# Patient Record
Sex: FEMALE | Race: WHITE | NOT HISPANIC OR LATINO | Employment: FULL TIME | URBAN - METROPOLITAN AREA
[De-identification: names, ages, dates, MRNs, and addresses within clinical notes are randomized per-mention and may not be internally consistent; named-entity substitution may affect disease eponyms.]

---

## 2017-01-06 ENCOUNTER — OFFICE VISIT - HEALTHEAST (OUTPATIENT)
Dept: INTERNAL MEDICINE | Facility: CLINIC | Age: 32
End: 2017-01-06

## 2017-01-06 DIAGNOSIS — H66.002 ACUTE SUPPURATIVE OTITIS MEDIA OF LEFT EAR WITHOUT SPONTANEOUS RUPTURE OF TYMPANIC MEMBRANE, RECURRENCE NOT SPECIFIED: ICD-10-CM

## 2017-01-06 ASSESSMENT — MIFFLIN-ST. JEOR: SCORE: 1401.88

## 2017-02-07 ENCOUNTER — COMMUNICATION - HEALTHEAST (OUTPATIENT)
Dept: INTERNAL MEDICINE | Facility: CLINIC | Age: 32
End: 2017-02-07

## 2017-02-07 ENCOUNTER — AMBULATORY - HEALTHEAST (OUTPATIENT)
Dept: INTERNAL MEDICINE | Facility: CLINIC | Age: 32
End: 2017-02-07

## 2017-02-07 ENCOUNTER — AMBULATORY - HEALTHEAST (OUTPATIENT)
Dept: LAB | Facility: CLINIC | Age: 32
End: 2017-02-07

## 2017-02-07 DIAGNOSIS — N39.0 UTI (URINARY TRACT INFECTION): ICD-10-CM

## 2017-02-09 ENCOUNTER — COMMUNICATION - HEALTHEAST (OUTPATIENT)
Dept: INTERNAL MEDICINE | Facility: CLINIC | Age: 32
End: 2017-02-09

## 2017-02-17 ENCOUNTER — AMBULATORY - HEALTHEAST (OUTPATIENT)
Dept: LAB | Facility: CLINIC | Age: 32
End: 2017-02-17

## 2017-02-17 ENCOUNTER — COMMUNICATION - HEALTHEAST (OUTPATIENT)
Dept: LAB | Facility: CLINIC | Age: 32
End: 2017-02-17

## 2017-02-17 ENCOUNTER — AMBULATORY - HEALTHEAST (OUTPATIENT)
Dept: INTERNAL MEDICINE | Facility: CLINIC | Age: 32
End: 2017-02-17

## 2017-02-17 DIAGNOSIS — N39.0 UTI (URINARY TRACT INFECTION): ICD-10-CM

## 2017-02-23 ENCOUNTER — OFFICE VISIT - HEALTHEAST (OUTPATIENT)
Dept: INTERNAL MEDICINE | Facility: CLINIC | Age: 32
End: 2017-02-23

## 2017-02-23 ENCOUNTER — COMMUNICATION - HEALTHEAST (OUTPATIENT)
Dept: INTERNAL MEDICINE | Facility: CLINIC | Age: 32
End: 2017-02-23

## 2017-02-23 DIAGNOSIS — N39.0 UTI (URINARY TRACT INFECTION): ICD-10-CM

## 2017-02-23 DIAGNOSIS — E10.9 TYPE 1 DIABETES MELLITUS WITHOUT COMPLICATION (H): ICD-10-CM

## 2017-02-23 LAB
CHOLEST SERPL-MCNC: 202 MG/DL
FASTING STATUS PATIENT QL REPORTED: YES
HBA1C MFR BLD: 8.2 % (ref 4.2–6.1)
HDLC SERPL-MCNC: 75 MG/DL
LDLC SERPL CALC-MCNC: 116 MG/DL
TRIGL SERPL-MCNC: 55 MG/DL

## 2017-02-23 ASSESSMENT — MIFFLIN-ST. JEOR: SCORE: 1397.35

## 2017-02-25 ENCOUNTER — COMMUNICATION - HEALTHEAST (OUTPATIENT)
Dept: INTERNAL MEDICINE | Facility: CLINIC | Age: 32
End: 2017-02-25

## 2017-02-27 ENCOUNTER — COMMUNICATION - HEALTHEAST (OUTPATIENT)
Dept: INTERNAL MEDICINE | Facility: CLINIC | Age: 32
End: 2017-02-27

## 2017-04-17 ENCOUNTER — COMMUNICATION - HEALTHEAST (OUTPATIENT)
Dept: INTERNAL MEDICINE | Facility: CLINIC | Age: 32
End: 2017-04-17

## 2017-07-09 ENCOUNTER — COMMUNICATION - HEALTHEAST (OUTPATIENT)
Dept: INTERNAL MEDICINE | Facility: CLINIC | Age: 32
End: 2017-07-09

## 2017-09-27 ENCOUNTER — RECORDS - HEALTHEAST (OUTPATIENT)
Dept: ADMINISTRATIVE | Facility: OTHER | Age: 32
End: 2017-09-27

## 2017-10-01 ENCOUNTER — COMMUNICATION - HEALTHEAST (OUTPATIENT)
Dept: INTERNAL MEDICINE | Facility: CLINIC | Age: 32
End: 2017-10-01

## 2017-10-01 DIAGNOSIS — E10.9 TYPE 1 DIABETES MELLITUS WITHOUT COMPLICATION (H): ICD-10-CM

## 2017-10-19 ENCOUNTER — OFFICE VISIT - HEALTHEAST (OUTPATIENT)
Dept: INTERNAL MEDICINE | Facility: CLINIC | Age: 32
End: 2017-10-19

## 2017-10-19 ENCOUNTER — COMMUNICATION - HEALTHEAST (OUTPATIENT)
Dept: INTERNAL MEDICINE | Facility: CLINIC | Age: 32
End: 2017-10-19

## 2017-10-19 DIAGNOSIS — E10.9 TYPE 1 DIABETES MELLITUS WITHOUT COMPLICATION (H): ICD-10-CM

## 2017-10-19 DIAGNOSIS — G47.9 SLEEP DISTURBANCE: ICD-10-CM

## 2017-10-19 DIAGNOSIS — F41.9 ANXIETY: ICD-10-CM

## 2017-10-19 DIAGNOSIS — R53.83 FATIGUE: ICD-10-CM

## 2017-10-19 LAB
CHOLEST SERPL-MCNC: 186 MG/DL
FASTING STATUS PATIENT QL REPORTED: YES
HBA1C MFR BLD: 7.5 % (ref 3.5–6)
HDLC SERPL-MCNC: 64 MG/DL
LDLC SERPL CALC-MCNC: 112 MG/DL
TRIGL SERPL-MCNC: 48 MG/DL

## 2017-10-19 ASSESSMENT — MIFFLIN-ST. JEOR: SCORE: 1420.03

## 2017-10-23 ENCOUNTER — AMBULATORY - HEALTHEAST (OUTPATIENT)
Dept: INTERNAL MEDICINE | Facility: CLINIC | Age: 32
End: 2017-10-23

## 2017-10-23 DIAGNOSIS — G47.9 SLEEP DISORDER: ICD-10-CM

## 2017-11-02 ENCOUNTER — COMMUNICATION - HEALTHEAST (OUTPATIENT)
Dept: INTERNAL MEDICINE | Facility: CLINIC | Age: 32
End: 2017-11-02

## 2017-11-06 ENCOUNTER — COMMUNICATION - HEALTHEAST (OUTPATIENT)
Dept: INTERNAL MEDICINE | Facility: CLINIC | Age: 32
End: 2017-11-06

## 2017-12-06 ENCOUNTER — RECORDS - HEALTHEAST (OUTPATIENT)
Dept: ADMINISTRATIVE | Facility: OTHER | Age: 32
End: 2017-12-06

## 2017-12-06 LAB
LAB AP CHARGES (HE HISTORICAL CONVERSION): NORMAL
LAB MED GENERAL PATH INTERP (HE HISTORICAL CONVERSION): NORMAL
PATH REPORT.COMMENTS IMP SPEC: NORMAL
PATH REPORT.FINAL DX SPEC: NORMAL
PATH REPORT.MICROSCOPIC SPEC OTHER STN: NORMAL
SPECIMEN DESCRIPTION: NORMAL

## 2018-01-22 ENCOUNTER — COMMUNICATION - HEALTHEAST (OUTPATIENT)
Dept: INTERNAL MEDICINE | Facility: CLINIC | Age: 33
End: 2018-01-22

## 2018-01-24 ENCOUNTER — RECORDS - HEALTHEAST (OUTPATIENT)
Dept: ADMINISTRATIVE | Facility: OTHER | Age: 33
End: 2018-01-24

## 2018-01-24 ENCOUNTER — AMBULATORY - HEALTHEAST (OUTPATIENT)
Dept: INTERNAL MEDICINE | Facility: CLINIC | Age: 33
End: 2018-01-24

## 2018-01-24 DIAGNOSIS — M25.559 HIP PAIN: ICD-10-CM

## 2018-01-24 DIAGNOSIS — R10.9 ABDOMINAL PAIN: ICD-10-CM

## 2018-01-25 ENCOUNTER — COMMUNICATION - HEALTHEAST (OUTPATIENT)
Dept: INTERNAL MEDICINE | Facility: CLINIC | Age: 33
End: 2018-01-25

## 2018-01-30 ENCOUNTER — RECORDS - HEALTHEAST (OUTPATIENT)
Dept: ADMINISTRATIVE | Facility: OTHER | Age: 33
End: 2018-01-30

## 2018-02-01 ENCOUNTER — OFFICE VISIT - HEALTHEAST (OUTPATIENT)
Dept: SLEEP MEDICINE | Facility: CLINIC | Age: 33
End: 2018-02-01

## 2018-02-01 DIAGNOSIS — G47.10 HYPERSOMNIA: ICD-10-CM

## 2018-02-01 DIAGNOSIS — G47.8 SLEEP DYSFUNCTION WITH SLEEP STAGE DISTURBANCE: ICD-10-CM

## 2018-02-01 DIAGNOSIS — R06.83 SNORING: ICD-10-CM

## 2018-02-01 ASSESSMENT — MIFFLIN-ST. JEOR: SCORE: 1400.07

## 2018-02-05 ENCOUNTER — COMMUNICATION - HEALTHEAST (OUTPATIENT)
Dept: INTERNAL MEDICINE | Facility: CLINIC | Age: 33
End: 2018-02-05

## 2018-02-16 ENCOUNTER — RECORDS - HEALTHEAST (OUTPATIENT)
Dept: ADMINISTRATIVE | Facility: OTHER | Age: 33
End: 2018-02-16

## 2018-02-21 ENCOUNTER — COMMUNICATION - HEALTHEAST (OUTPATIENT)
Dept: EDUCATION SERVICES | Facility: CLINIC | Age: 33
End: 2018-02-21

## 2018-02-22 ENCOUNTER — RECORDS - HEALTHEAST (OUTPATIENT)
Dept: SLEEP MEDICINE | Facility: CLINIC | Age: 33
End: 2018-02-22

## 2018-02-22 ENCOUNTER — RECORDS - HEALTHEAST (OUTPATIENT)
Dept: ADMINISTRATIVE | Facility: OTHER | Age: 33
End: 2018-02-22

## 2018-02-22 DIAGNOSIS — G47.10 HYPERSOMNIA, UNSPECIFIED: ICD-10-CM

## 2018-02-22 DIAGNOSIS — R06.83 SNORING: ICD-10-CM

## 2018-02-22 DIAGNOSIS — G47.8 OTHER SLEEP DISORDERS: ICD-10-CM

## 2018-03-03 ENCOUNTER — COMMUNICATION - HEALTHEAST (OUTPATIENT)
Dept: SLEEP MEDICINE | Facility: CLINIC | Age: 33
End: 2018-03-03

## 2018-03-05 ENCOUNTER — COMMUNICATION - HEALTHEAST (OUTPATIENT)
Dept: INTERNAL MEDICINE | Facility: CLINIC | Age: 33
End: 2018-03-05

## 2018-03-06 ENCOUNTER — AMBULATORY - HEALTHEAST (OUTPATIENT)
Dept: INTERNAL MEDICINE | Facility: CLINIC | Age: 33
End: 2018-03-06

## 2018-03-12 ENCOUNTER — RECORDS - HEALTHEAST (OUTPATIENT)
Dept: ADMINISTRATIVE | Facility: OTHER | Age: 33
End: 2018-03-12

## 2018-03-16 ENCOUNTER — AMBULATORY - HEALTHEAST (OUTPATIENT)
Dept: LAB | Facility: CLINIC | Age: 33
End: 2018-03-16

## 2018-03-16 ENCOUNTER — COMMUNICATION - HEALTHEAST (OUTPATIENT)
Dept: SCHEDULING | Facility: CLINIC | Age: 33
End: 2018-03-16

## 2018-03-16 ENCOUNTER — COMMUNICATION - HEALTHEAST (OUTPATIENT)
Dept: INTERNAL MEDICINE | Facility: CLINIC | Age: 33
End: 2018-03-16

## 2018-03-16 ENCOUNTER — AMBULATORY - HEALTHEAST (OUTPATIENT)
Dept: INTERNAL MEDICINE | Facility: CLINIC | Age: 33
End: 2018-03-16

## 2018-03-16 DIAGNOSIS — R30.0 DYSURIA: ICD-10-CM

## 2018-03-16 DIAGNOSIS — N39.0 UTI (URINARY TRACT INFECTION): ICD-10-CM

## 2018-03-16 LAB
ALBUMIN UR-MCNC: NEGATIVE MG/DL
APPEARANCE UR: CLEAR
BACTERIA #/AREA URNS HPF: ABNORMAL HPF
BILIRUB UR QL STRIP: NEGATIVE
COLOR UR AUTO: YELLOW
GLUCOSE UR STRIP-MCNC: ABNORMAL MG/DL
HGB UR QL STRIP: NEGATIVE
KETONES UR STRIP-MCNC: NEGATIVE MG/DL
LEUKOCYTE ESTERASE UR QL STRIP: NEGATIVE
NITRATE UR QL: POSITIVE
PH UR STRIP: 5 [PH] (ref 5–8)
RBC #/AREA URNS AUTO: ABNORMAL HPF
SP GR UR STRIP: 1.02 (ref 1–1.03)
SQUAMOUS #/AREA URNS AUTO: ABNORMAL LPF
UROBILINOGEN UR STRIP-ACNC: ABNORMAL
WBC #/AREA URNS AUTO: ABNORMAL HPF

## 2018-03-18 LAB — BACTERIA SPEC CULT: ABNORMAL

## 2018-04-03 ENCOUNTER — RECORDS - HEALTHEAST (OUTPATIENT)
Dept: ADMINISTRATIVE | Facility: OTHER | Age: 33
End: 2018-04-03

## 2018-04-09 ENCOUNTER — RECORDS - HEALTHEAST (OUTPATIENT)
Dept: ADMINISTRATIVE | Facility: OTHER | Age: 33
End: 2018-04-09

## 2018-04-17 ENCOUNTER — RECORDS - HEALTHEAST (OUTPATIENT)
Dept: ADMINISTRATIVE | Facility: OTHER | Age: 33
End: 2018-04-17

## 2018-04-20 ENCOUNTER — RECORDS - HEALTHEAST (OUTPATIENT)
Dept: ADMINISTRATIVE | Facility: OTHER | Age: 33
End: 2018-04-20

## 2018-04-24 ENCOUNTER — RECORDS - HEALTHEAST (OUTPATIENT)
Dept: ADMINISTRATIVE | Facility: OTHER | Age: 33
End: 2018-04-24

## 2018-05-11 ENCOUNTER — HOSPITAL ENCOUNTER (OUTPATIENT)
Dept: NUCLEAR MEDICINE | Facility: CLINIC | Age: 33
Discharge: HOME | End: 2018-05-11

## 2018-05-11 DIAGNOSIS — R14.0 BLOATING: ICD-10-CM

## 2018-05-14 ENCOUNTER — RECORDS - HEALTHEAST (OUTPATIENT)
Dept: ADMINISTRATIVE | Facility: OTHER | Age: 33
End: 2018-05-14

## 2018-05-24 ENCOUNTER — RECORDS - HEALTHEAST (OUTPATIENT)
Dept: ADMINISTRATIVE | Facility: OTHER | Age: 33
End: 2018-05-24

## 2018-06-01 ENCOUNTER — RECORDS - HEALTHEAST (OUTPATIENT)
Dept: ADMINISTRATIVE | Facility: OTHER | Age: 33
End: 2018-06-01

## 2018-06-08 ENCOUNTER — RECORDS - HEALTHEAST (OUTPATIENT)
Dept: ADMINISTRATIVE | Facility: OTHER | Age: 33
End: 2018-06-08

## 2018-06-14 ENCOUNTER — RECORDS - HEALTHEAST (OUTPATIENT)
Dept: ADMINISTRATIVE | Facility: OTHER | Age: 33
End: 2018-06-14

## 2018-06-20 ENCOUNTER — COMMUNICATION - HEALTHEAST (OUTPATIENT)
Dept: INTERNAL MEDICINE | Facility: CLINIC | Age: 33
End: 2018-06-20

## 2018-08-03 ENCOUNTER — RECORDS - HEALTHEAST (OUTPATIENT)
Dept: ADMINISTRATIVE | Facility: OTHER | Age: 33
End: 2018-08-03

## 2018-09-08 ENCOUNTER — RECORDS - HEALTHEAST (OUTPATIENT)
Dept: ADMINISTRATIVE | Facility: OTHER | Age: 33
End: 2018-09-08

## 2018-11-02 ENCOUNTER — RECORDS - HEALTHEAST (OUTPATIENT)
Dept: ADMINISTRATIVE | Facility: OTHER | Age: 33
End: 2018-11-02

## 2018-11-05 ENCOUNTER — RECORDS - HEALTHEAST (OUTPATIENT)
Dept: ADMINISTRATIVE | Facility: OTHER | Age: 33
End: 2018-11-05

## 2018-11-16 ENCOUNTER — RECORDS - HEALTHEAST (OUTPATIENT)
Dept: ADMINISTRATIVE | Facility: OTHER | Age: 33
End: 2018-11-16

## 2018-11-30 ENCOUNTER — OFFICE VISIT - HEALTHEAST (OUTPATIENT)
Dept: FAMILY MEDICINE | Facility: CLINIC | Age: 33
End: 2018-11-30

## 2018-11-30 DIAGNOSIS — J06.9 VIRAL UPPER RESPIRATORY TRACT INFECTION: ICD-10-CM

## 2018-11-30 DIAGNOSIS — H69.93 EUSTACHIAN TUBE DYSFUNCTION, BILATERAL: ICD-10-CM

## 2018-11-30 LAB — DEPRECATED S PYO AG THROAT QL EIA: NORMAL

## 2018-12-01 LAB — GROUP A STREP BY PCR: NORMAL

## 2018-12-03 ENCOUNTER — COMMUNICATION - HEALTHEAST (OUTPATIENT)
Dept: INTERNAL MEDICINE | Facility: CLINIC | Age: 33
End: 2018-12-03

## 2019-01-24 ENCOUNTER — COMMUNICATION - HEALTHEAST (OUTPATIENT)
Dept: INTERNAL MEDICINE | Facility: CLINIC | Age: 34
End: 2019-01-24

## 2019-01-25 ENCOUNTER — OFFICE VISIT - HEALTHEAST (OUTPATIENT)
Dept: FAMILY MEDICINE | Facility: CLINIC | Age: 34
End: 2019-01-25

## 2019-01-25 DIAGNOSIS — S60.10XA HEMATOMA, SUBUNGUAL, FINGER, INITIAL ENCOUNTER: ICD-10-CM

## 2019-01-25 DIAGNOSIS — S67.10XA CRUSHING INJURY OF FINGER, INITIAL ENCOUNTER: ICD-10-CM

## 2019-01-31 ENCOUNTER — RECORDS - HEALTHEAST (OUTPATIENT)
Dept: ADMINISTRATIVE | Facility: OTHER | Age: 34
End: 2019-01-31

## 2019-02-20 ENCOUNTER — RECORDS - HEALTHEAST (OUTPATIENT)
Dept: ADMINISTRATIVE | Facility: OTHER | Age: 34
End: 2019-02-20

## 2019-02-25 ENCOUNTER — RECORDS - HEALTHEAST (OUTPATIENT)
Dept: ADMINISTRATIVE | Facility: OTHER | Age: 34
End: 2019-02-25

## 2019-02-28 ENCOUNTER — RECORDS - HEALTHEAST (OUTPATIENT)
Dept: ADMINISTRATIVE | Facility: OTHER | Age: 34
End: 2019-02-28

## 2019-03-10 ENCOUNTER — RECORDS - HEALTHEAST (OUTPATIENT)
Dept: ADMINISTRATIVE | Facility: OTHER | Age: 34
End: 2019-03-10

## 2019-04-10 ENCOUNTER — RECORDS - HEALTHEAST (OUTPATIENT)
Dept: ADMINISTRATIVE | Facility: OTHER | Age: 34
End: 2019-04-10

## 2019-04-22 ENCOUNTER — RECORDS - HEALTHEAST (OUTPATIENT)
Dept: ADMINISTRATIVE | Facility: OTHER | Age: 34
End: 2019-04-22

## 2019-04-25 ENCOUNTER — COMMUNICATION - HEALTHEAST (OUTPATIENT)
Dept: INTERNAL MEDICINE | Facility: CLINIC | Age: 34
End: 2019-04-25

## 2019-04-29 ENCOUNTER — RECORDS - HEALTHEAST (OUTPATIENT)
Dept: ADMINISTRATIVE | Facility: OTHER | Age: 34
End: 2019-04-29

## 2019-05-21 ENCOUNTER — RECORDS - HEALTHEAST (OUTPATIENT)
Dept: ADMINISTRATIVE | Facility: OTHER | Age: 34
End: 2019-05-21

## 2019-08-20 ENCOUNTER — RECORDS - HEALTHEAST (OUTPATIENT)
Dept: ADMINISTRATIVE | Facility: OTHER | Age: 34
End: 2019-08-20

## 2019-09-05 ENCOUNTER — RECORDS - HEALTHEAST (OUTPATIENT)
Dept: ADMINISTRATIVE | Facility: OTHER | Age: 34
End: 2019-09-05

## 2019-11-27 ENCOUNTER — RECORDS - HEALTHEAST (OUTPATIENT)
Dept: ADMINISTRATIVE | Facility: OTHER | Age: 34
End: 2019-11-27

## 2019-12-10 ENCOUNTER — RECORDS - HEALTHEAST (OUTPATIENT)
Dept: ADMINISTRATIVE | Facility: OTHER | Age: 34
End: 2019-12-10

## 2020-01-07 ENCOUNTER — RECORDS - HEALTHEAST (OUTPATIENT)
Dept: ADMINISTRATIVE | Facility: OTHER | Age: 35
End: 2020-01-07

## 2020-04-30 ENCOUNTER — RECORDS - HEALTHEAST (OUTPATIENT)
Dept: ADMINISTRATIVE | Facility: OTHER | Age: 35
End: 2020-04-30

## 2021-04-08 ENCOUNTER — AMBULATORY - HEALTHEAST (OUTPATIENT)
Dept: LAB | Facility: CLINIC | Age: 36
End: 2021-04-08

## 2021-04-08 ENCOUNTER — OFFICE VISIT - HEALTHEAST (OUTPATIENT)
Dept: FAMILY MEDICINE | Facility: CLINIC | Age: 36
End: 2021-04-08

## 2021-04-08 DIAGNOSIS — Z20.822 CLOSE EXPOSURE TO 2019 NOVEL CORONAVIRUS: ICD-10-CM

## 2021-04-10 ENCOUNTER — COMMUNICATION - HEALTHEAST (OUTPATIENT)
Dept: SCHEDULING | Facility: CLINIC | Age: 36
End: 2021-04-10

## 2021-05-27 ENCOUNTER — RECORDS - HEALTHEAST (OUTPATIENT)
Dept: ADMINISTRATIVE | Facility: OTHER | Age: 36
End: 2021-05-27

## 2021-05-30 VITALS — WEIGHT: 147 LBS | HEIGHT: 68 IN | BODY MASS INDEX: 22.28 KG/M2

## 2021-05-30 VITALS — WEIGHT: 148 LBS | BODY MASS INDEX: 22.43 KG/M2 | HEIGHT: 68 IN

## 2021-05-31 VITALS — BODY MASS INDEX: 22.68 KG/M2 | WEIGHT: 147 LBS | HEIGHT: 68 IN | BODY MASS INDEX: 22.68 KG/M2

## 2021-05-31 VITALS — HEIGHT: 68 IN | WEIGHT: 147.6 LBS | BODY MASS INDEX: 22.37 KG/M2

## 2021-05-31 VITALS — BODY MASS INDEX: 23.04 KG/M2 | HEIGHT: 68 IN | WEIGHT: 152 LBS

## 2021-06-02 VITALS — BODY MASS INDEX: 22.38 KG/M2 | WEIGHT: 145 LBS

## 2021-06-02 VITALS — WEIGHT: 142 LBS | BODY MASS INDEX: 21.91 KG/M2

## 2021-06-08 NOTE — PROGRESS NOTES
"  Office Visit - Follow Up   Kylah Taylor   31 y.o. female    Date of Visit: 1/6/2017    Chief Complaint   Patient presents with     Sinus Problem        Assessment and Plan   1. Acute suppurative otitis media of left ear without spontaneous rupture of tympanic membrane, recurrence not specified  We'll treat with Augmentin, recommended Sudafed, plenty of rest ibuprofen and acetaminophen    Return if symptoms worsen or fail to improve, for follow up with PCP.     History of Present Illness   This 31 y.o. old woman comes in for evaluation of over 1 week of sore throat and ear pain.  She is in [place] and arrived home on Wednesday.  She had excruciating pain in her left ear with takeoff and landing and this has persisted.  She has significant frontal facial pain and tooth pain.  He can't hear out of her left ear very well.  No fever or chills.  Some cough.  No shortness of breath.  No normal exposures while in [place]     Review of Systems: A comprehensive review of systems was negative except as noted.     Medications, Allergies and Problem List   Reviewed and updated     Physical Exam   General Appearance:   No acute distress    Visit Vitals     /62 (Patient Site: Right Arm, Patient Position: Sitting, Cuff Size: Adult Regular)     Pulse 89     Ht 5' 7.5\" (1.715 m)     Wt 148 lb (67.1 kg)     SpO2 99%     BMI 22.84 kg/m2       She has erythema bulging and purulence of the left tympanic membrane pain with palpation of the maxillary sinuses and lymphoid reticulation posterior oropharynx.  She has shotty cervical lymphadenopathy more on the left and right lungs clear auscultation bilaterally cardiovascular regular rate and rhythm no murmur gallop or rub      Additional Information   Current Outpatient Prescriptions   Medication Sig Dispense Refill     amoxicillin-clavulanate (AUGMENTIN) 875-125 mg per tablet Take 1 tablet by mouth 2 (two) times a day for 10 days. 20 tablet 0     CONTOUR NEXT STRIPS " strips USE THREE TIMES A  each 0     NOVOLOG 100 unit/mL injection INJECT UP TO 60 UNITS DAILY WITH INSULIN PUMP AS DIRECTED 20 mL 5     NOVOLOG 100 unit/mL injection INJECT UP TO 60 UNITS DAILY WITH INSULIN PUMP AS DIRECTED 20 mL 4     No current facility-administered medications for this visit.      No Known Allergies  Social History   Substance Use Topics     Smoking status: Never Smoker     Smokeless tobacco: Never Used     Alcohol use None       Review and/or order of clinical lab tests:  Review and/or order of radiology tests:  Review and/or order of medicine tests:  Discussion of test results with performing physician:  Decision to obtain old records and/or obtain history from someone other than the patient:  Review and summarization of old records and/or obtaining history from someone other than the patient and.or discussion of case with another health care provider:  Independent visualization of image, tracing or specimen itself:    Time:      Jeremy Anderson MD

## 2021-06-09 ENCOUNTER — RECORDS - HEALTHEAST (OUTPATIENT)
Dept: LAB | Facility: CLINIC | Age: 36
End: 2021-06-09

## 2021-06-09 LAB
APPEARANCE FLD: CLEAR
COLOR FLD: ABNORMAL
CRYSTALS SNV MICRO: NORMAL
EOSINOPHIL NFR FLD MANUAL: ABNORMAL %
LYMPHOCYTES NFR FLD MANUAL: 68 %
MACROPHAGE % - HISTORICAL: ABNORMAL
MESOTHELIALS, FLUID: ABNORMAL
MONOCYTE % - HISTORICAL: 27 %
NEUTS BAND NFR FLD MANUAL: 5 %
OTHER CELLS FLD MANUAL: ABNORMAL
RBC FLUID - HISTORICAL: ABNORMAL /UL
WBC # FLD AUTO: 348 /UL (ref 0–99)

## 2021-06-09 NOTE — PROGRESS NOTES
[facility] Clinic Follow Up Note    Kylah Taylor   31 y.o. female    Date of Visit: 2/23/2017    Chief Complaint   Patient presents with     Diabetes     pt fasting     Subjective  This is a very pleasant 31-year-old lady with a long-standing history of type 1 diabetes.  When I saw her last summer she was having some vague abdominal discomfort.  I would refer to my notes for details.  The symptoms have not entirely cleared but they are not preventing her from going about her usual activities and they have not gotten any worse.  She blames it on some poor eating habits that she says she has developed.  She was also under some stress as her mother and mother-in-law are both living with her.  Since then her mother-in-law has a left the home and so she is only dealing with her own mother at this time.  She recently had to infectious incidence.  The first was an ear infection following a trip to [place].  This cleared with antibiotics.  Then, more recently she developed a urinary tract infection.  This also responded nicely to an antibiotic and she is now symptom-free.  Otherwise she has been feeling reasonably good although she does feel she has some ongoing fatigue.  She did not bring a log book but her sugars she said been under fairly good control except during the infection.  She does use an insulin pump.  She comes in today because she is due for routine lab work relative to the diabetes.    ROS A comprehensive review of systems was performed and was otherwise negative    Medications, allergies, and problem list were reviewed and updated    Exam  General Appearance:   On examination her blood pressure is 122/60.  Weight is 147 pounds and height is 67.5 inches.  BMI is 22.68.    Lungs are clear.    Heart is in a sinus rhythm with a rate of 82 and no ectopy.    No peripheral edema.  Gait is normal.    The patient is alert and oriented ×3.      Assessment/Plan  1. Type 1 diabetes  mellitus without complication  Comprehensive Metabolic Panel    Lipid Cascade    Glycosylated Hemoglobin A1c    Microalbumin, Random Urine     Type 1 diabetes that has been reasonably well controlled on the insulin pump.  Her recent infection seemed to have cleared.  We will go ahead with lab work to include CMP, lipids, A1c and microalbumin.  I will contact her with those results and make any appropriate recommendations.  She said that she will try harder with the diet.  I will follow-up with her in 6 months or so.    Total time of this office visit was 25 minutes with greater than 50% of the time spent in care coordination and patient counseling.      Jeremy Leon MD      Current Outpatient Prescriptions on File Prior to Visit   Medication Sig     CONTOUR NEXT STRIPS strips USE THREE TIMES A DAY     NOVOLOG 100 unit/mL injection INJECT UP TO 60 UNITS DAILY WITH INSULIN PUMP AS DIRECTED     NOVOLOG 100 unit/mL injection INJECT UP TO 60 UNITS DAILY WITH INSULIN PUMP AS DIRECTED     No current facility-administered medications on file prior to visit.      No Known Allergies  Social History   Substance Use Topics     Smoking status: Never Smoker     Smokeless tobacco: Never Used     Alcohol use None

## 2021-06-12 LAB
BACTERIA SPEC CULT: NO GROWTH
BACTERIA SPEC CULT: NORMAL
GRAM STAIN RESULT: NORMAL
GRAM STAIN RESULT: NORMAL

## 2021-06-13 NOTE — PROGRESS NOTES
[facility] Clinic Follow Up Note    Kylah Taylor   32 y.o. female    Date of Visit: 10/19/2017    Chief Complaint   Patient presents with     Diabetes     pt fasting     Subjective  This is a 32-year-old longtime type I diabetic.  She comes in because over the past 5 months she has had a marked increase in fatigue, weight gain, occasional nighttime cardiac palpitation and some increased irritability and anxiety.  She is somewhat of a restless sleeper.  She is not sure if she snores.  She tells me that even if she gets 8-9 hours of sleep she is still fatigued in the morning she reports that her sugars have been somewhat variable.  She has not seen an endocrinologist in quite some time and would probably benefit from seeing 1.  She is on an insulin pump.  She offers no further specific symptoms other than what we just listed.  She feels that over the past 5 months all of these symptoms have persisted and possibly gotten slightly worse.  No recent changes in medication.  She does not feel that she is under any undue stress or tension either at home or at work.    ROS A comprehensive review of systems was performed and was otherwise negative    Medications, allergies, and problem list were reviewed and updated    Exam  General Appearance:   On examination her blood pressure is 122/60.  Weight is 152 pounds and height is 67.5 inches.  BMI is 21.29.    Neck: Supple with no masses and no neck vein distention.  No thyroid enlargement.    Lungs: Clear.    Cardiovascular: Heart is in a sinus rhythm with a rate of 80 and no ectopy.  I hear no gallops or murmurs.  Carotid pulses are full.  No peripheral edema.    GI: Abdomen is soft and nontender with no distention.  No obvious masses organomegaly.    The patient is alert and oriented ×3.  Mood and affect seem appropriate today.      Assessment/Plan  1. Type 1 diabetes mellitus without complication  Lipid Cascade    Glycosylated Hemoglobin A1c     Ambulatory referral to Endocrinology   2. Fatigue  Comprehensive Metabolic Panel    Thyroid Stimulating Hormone (TSH)    HM1(CBC and Differential)    HM1 (CBC with Diff)   3. Anxiety     4. Sleep disturbance       Type 1 diabetes.  I did discuss this with her and a am not sure if her symptoms are related to the type 1 diabetes.  I do think it would be advantageous for her to reconnect with an endocrinologist especially as she is on the insulin pump.  She is agreeable to this and so we will set up a consultation.    Worsening fatigue.  Coupling this with her weight gain and palpitations and irritability and anxiety am not sure what the etiology is.  I would like to check a CMP, CBC and TSH.  We will also check her diabetic labs today including A1c and lipids.  My other concern is the possibility of sleep apnea.  Given her set of symptoms and problems with sleep this certainly is a possibility.  I discussed this with her and suggested that if we do not see something very specific on the labs we would certainly consider referral for sleep evaluation.  She is agreeable to this.    Total time of this office visit was 25 minutes with greater than 50% of the time spent in care coordination of patient counseling.      Jeremy Leon MD      Current Outpatient Prescriptions on File Prior to Visit   Medication Sig     CONTOUR NEXT STRIPS strips USE THREE TIMES A DAY     NOVOLOG 100 unit/mL injection INJECT UP TO 60 UNITS DAILY WITH INSULIN PUMP AS DIRECTED     No current facility-administered medications on file prior to visit.      No Known Allergies  Social History   Substance Use Topics     Smoking status: Never Smoker     Smokeless tobacco: Never Used     Alcohol use None

## 2021-06-15 NOTE — PROGRESS NOTES
Dear Dr. Jeremy Leon Md  [address] [place] [address]    Thank you for the opportunity to participate in the care of MsRenu Taylor.    She is a 32 y.o. female who comes to the clinic with a chief complaint of nonrestorative sleep has been going on for more than 5 years.  While the patient denies any episodes of witnessed apnea she has been told that she does have snoring during sleep.  The patient can sleep up to 12 hours per night but still feel tired upon awakening.  The patient's review of system is otherwise unremarkable.     Ideal Sleep-Wake Cycle(devoid of societal pressure):    Patient would try to initiate sleep at around 10-12 at night with a sleep latency of up to 30 minutes. The patient would have 5+ awakening. Final wake up time is around 8-9:30 AM.    Past Medical History  No past medical history on file.     Past Surgical History  No past surgical history on file.     Meds  Current Outpatient Prescriptions   Medication Sig Dispense Refill     CONTOUR NEXT STRIPS strips USE THREE TIMES A  each 3     NOVOLOG 100 unit/mL injection INJECT UP TO 60 UNITS DAILY WITH INSULIN PUMP AS DIRECTED 20 mL 2     No current facility-administered medications for this visit.         Allergies  Review of patient's allergies indicates no known allergies.     Social History  Social History     Social History     Marital status:      Spouse name: N/A     Number of children: N/A     Years of education: N/A     Occupational History     Not on file.     Social History Main Topics     Smoking status: Never Smoker     Smokeless tobacco: Never Used     Alcohol use Not on file     Drug use: Not on file     Sexual activity: Not on file     Other Topics Concern     Not on file     Social History Narrative        Family History  No family history on file.     Review of Systems:  Constitutional: Negative except as noted in HPI.   Eyes: Negative except as noted in HPI.   ENT: Negative  "except as noted in HPI.   Cardiovascular: Negative except as noted in HPI.   Respiratory: Negative except as noted in HPI.   Gastrointestinal: Negative except as noted in HPI.   Genitourinary: Negative except as noted in HPI.   Musculoskeletal: Negative except as noted in HPI.   Integumentary: Negative except as noted in HPI.   Neurological: Negative except as noted in HPI.   Psychiatric: Negative except as noted in HPI.   Endocrine: Negative except as noted in HPI.   Hematologic/Lymphatic: Negative except as noted in HPI.      STOP BANG 2/1/2018   Do you snore loudly (louder than talking or loud enough to be heard through closed doors)? 0   Do you often feel tired, fatigued, or sleepy during daytime? 1   Has anyone observed you stop breathing in your sleep? 0   Do you have or are you being treated for high blood pressure? 0   BMI more than 35 kg/m2 0   Age over 50 years old? 0   Neck circumference greater than 16 inches? 0   Gender male? 0   Total Score 1   Epworths Sleepiness Scale 2/1/2018   Sitting and reading 0   Watching TV 1   Sitting, inactive in a public place (e.g. a theatre or a meeting) 0   As a passenger in a car for an hour without a break 1   Lying down to rest in the afternoon when circumstances permit 3   Sitting and talking to someone 0   Sitting quietly after a lunch without alcohol 0   In a car, while stopped for a few minutes in traffic 0   Total score 5   Rooming 2/1/2018   Usual bedtime 10-12   Sleep Latency depends   Awakenings 5+   Wake Up Time 815-9   Energy Drinks 0   Coffee 1-4 small weekly   Cola 0   Difficulty falling asleep Yes   Difficulty staying asleep Yes   Excessive daytime tiredness Yes   Excessive daytime sleepiness Yes   Dozing off while driving No   Shift Worker No   Sleep Walking? No   Sleep Talking? No   Kicking or punching? No   Restless legs symptoms No       Physical Exam:  /62  Pulse 81  Ht 5' 7.5\" (1.715 m)  Wt 147 lb 9.6 oz (67 kg)  SpO2 100%  BMI 22.78 " "kg/m2  BMI:Body mass index is 22.78 kg/(m^2).   GEN: NAD, appropriate for age  Head: Normocephalic.  EYES: PERRLA, EOMI  ENT: Oropharynx is clear, mallampatti class 3+ airway. Uvula is intact  Nasal mucosa is moist without erythema  Neck : Thyroid is within normal limits. Neck circ 12\"  CV: Regular rate and rhythm, S1 & S2 positive.  LUNGS: Bilateral breathsounds heard.   ABDOMEN: Positive bowel sounds in all quadrants, soft, no rebound or guarding  MUSCULOSKELETAL: Bilateral trace leg swelling  SKIN: warm, dry, no rashes  Neurological: Alert, oriented to time, place, and person.  Psych: normal mood, normal affect     Labs/Studies:     Lab Results   Component Value Date    WBC 5.4 10/19/2017    HGB 12.8 10/19/2017    HCT 38.5 10/19/2017    MCV 90 10/19/2017     10/19/2017         Chemistry        Component Value Date/Time     10/19/2017 1135    K 3.8 10/19/2017 1135     10/19/2017 1135    CO2 27 10/19/2017 1135    BUN 11 10/19/2017 1135    CREATININE 0.78 10/19/2017 1135     (H) 10/19/2017 1135        Component Value Date/Time    CALCIUM 9.1 10/19/2017 1135    ALKPHOS 48 10/19/2017 1135    AST 10 10/19/2017 1135    ALT 10 10/19/2017 1135    BILITOT 0.9 10/19/2017 1135            Lab Results   Component Value Date    FERRITIN 61 12/02/2015     Lab Results   Component Value Date    TSH 1.18 10/19/2017         Assessment and Plan:  In summary Kylah Taylor is a 32 y.o. year old female here for sleep disturbance.  1.  Hypersomnia   Mrs. Kylah Taylor has high risk for obstructive sleep apnea based on the history of hypersomnia, snoring and a crowded airway. I educated the patient on the underlying pathophysiology of obstructive sleep apnea. We reviewed the risks associated with sleep apnea, including increased cardiovascular risk and overall death. We talked about treatments briefly. I recommend getting an split-night nocturnal polysomnography. The patient should return to the " clinic to discuss results and treatment option in a patient-centered approach.  2.  Snoring  3.  Other sleep disturbance    Patient verbalized understanding of these issues, agrees with the plan and all questions were answered today. Patient was given an opportuntity to voice any other symptoms or concerns not listed above. Patient did not have any other symptoms or concerns.      Patient told to return in one week after the sleep study is interpreted.      Lopez Varma DO  Board Certified in Internal Medicine and Sleep Medicine  [facility].    (Note created with Dragon voice recognition and unintended spelling errors and word substitutions may occur)

## 2021-06-16 PROBLEM — E11.43 GASTROPARESIS DIABETICORUM (H): Status: ACTIVE | Noted: 2018-05-24

## 2021-06-16 PROBLEM — K31.84 GASTROPARESIS DIABETICORUM (H): Status: ACTIVE | Noted: 2018-05-24

## 2021-06-16 PROBLEM — K63.8219 SMALL INTESTINAL BACTERIAL OVERGROWTH: Status: ACTIVE | Noted: 2018-05-24

## 2021-06-16 PROBLEM — K59.00 CONSTIPATION, UNSPECIFIED: Status: ACTIVE | Noted: 2018-03-12

## 2021-06-16 PROBLEM — R53.83 FATIGUE: Status: ACTIVE | Noted: 2019-12-08

## 2021-06-16 NOTE — PATIENT INSTRUCTIONS - HE
"  Dear Kylah Taylor,    Based on your exposure to COVID-19 (coronavirus), we would like to test you for this virus. I have placed an order for this test.The best time for testing is 5-7 days after the exposure.    How to schedule:  Go to your [employer] home page and scroll down to the section that says  You have an appointment that needs to be scheduled  and click the large green button that says  Schedule Now  and follow the steps to find the next available opening.     If you are unable to complete these [employer] scheduling steps, please call [phone] to schedule your testing.     Return to work/school/ guidance:   For people with high risk exposures outside the home    Please let your workplace manager and staffing office know when your quarantine ends.     We can not give you an exact date as it depends on the information below. You can calculate this on your own or work with your manager/staffing office to calculate this. (For example if you were exposed on 10/4, you would have to quarantine for 14 full days. That would be through 10/18. You could return on 10/19.)    Quarantine Guidelines:  Patients (\"contacts\") who have been in close prolonged contact of an infected person(s) (within six feet for at least 15 minutes within a 24 hour period), and remain asymptomatic should enter quarantine based on the following options:    14-day quarantine period (this remains the CDC recommendation for the greatest protection against spread of COVID-19) OR    Minimum 7-day quarantine with negative RT-PCR test collected on day 5 or later OR    10-day quarantine with no test  Quarantine Guideline exceptions are as follows:    People who have been fully vaccinated do not need to quarantine if the exposure was at least 2 weeks after the last vaccination. This includes vaccinated health care workers.    Not fully vaccinated and unvaccinated Individuals who work in health care, congregate care, or congregate " living should be off work for 14 days from their last date of exposure. Community activities for this group can be resumed based on options above. Fully vaccinated individuals in this group do not need to quarantine from work after exposure.    Not fully vaccinated and unvaccinated people whose high-risk exposure was a household member should always quarantine for 14 days from their last date of exposure. Fully vaccinated people in this category do not need to quarantine.    Not fully vaccinated or unvaccinated residents of congregate care and congregate living settings should always quarantine for 14 days from their last date of exposure. Fully vaccinated residents do not need to quarantine.  Note: If you have ongoing exposure to the covid positive person, this quarantine period may be more than 14 days. (For example, if you are continued to be exposed to your child who tested positive and cannot isolate from them, then the quarantine of 7-14 days can't start until your child is no longer contagious. This is typically 10 days from onset of the child's symptoms. So the total duration may be 17-24 days in this case.)    You should continue symptom monitoring until day 14 post-exposure. If you develop signs or symptoms of COVID-19, isolate and get tested (even if you have been tested already).    How to quarantine:   Stay home and away from others. Don't go to school or anywhere else. Generally quarantine means staying home from work but there are some exceptions to this. Please contact your workplace.  No hugging, kissing or shaking hands.  Don't let anyone visit.  Cover your mouth and nose with a mask, tissue or washcloth to avoid spreading germs.  Wash your hands and face often. Use soap and water.    What are the symptoms of COVID-19?  The most common symptoms are cough, fever and trouble breathing. Less common symptoms include headache, body aches, fatigue (feeling very tired), chills, sore throat, stuffy or  runny nose, diarrhea (loose poop), loss of taste or smell, belly pain, and nausea or vomiting (feeling sick to your stomach or throwing up).  After 14 days, if you have still don't have symptoms, you likely don't have this virus.  If you develop symptoms, follow these guidelines.  If you're normally healthy: Please start another eVisit.  If you have a serious health problem (like cancer, heart failure, an organ transplant or kidney disease): Call your specialty clinic. Let them know that you might have COVID-19.    Where can I get more information?   "[employer] [place] - About COVID-19: [email or web address]  CDC - What to Do If You're Sick: www.cdc.gov/coronavirus/2019-ncov/about/steps-when-sick.html  CDC - Ending Home Isolation: www.cdc.gov/coronavirus/2019-ncov/hcp/disposition-in-home-patients.html  CDC - Caring for Someone: www.cdc.gov/coronavirus/2019-ncov/if-you-are-sick/care-for-someone.html  [facility] clinical trials (COVID-19 research studies): clinicalaffairs.[facility]-clinical-trials  Below are the COVID-19 hotlines at the [place] Department of Health ([facility]). Interpreters are available.  For health questions: Call 066-473-0390 or 1-760.463.9003 (7 a.m. to 7 p.m.)  For questions about schools and childcare: Call 022-739-5425 or 1-749.925.6245 (7 a.m. to 7 p.m.)

## 2021-06-17 NOTE — PATIENT INSTRUCTIONS - HE
Patient Instructions by Geovani Freire PA-C at 1/25/2019  2:00 PM     Author: Geovani Freire PA-C Service: -- Author Type: Physician Assistant    Filed: 1/25/2019  2:27 PM Encounter Date: 1/25/2019 Status: Addendum    : Geovani Freire PA-C (Physician Assistant)    Related Notes: Original Note by Geovani Freire PA-C (Physician Assistant) filed at 1/25/2019  2:27 PM       Keep the area clean dry and protected.  Elevate as much as possible above the level of the heart  Follow-up with your primary care provider if not getting 100% resolution of any other new symptoms or concerns arise.      Patient Education     Subungual Hematoma  A subungual hematoma is blood under the nail. It can occur when your finger or toe is hit or crushed. It causes the nail to look blue. In some cases, you may fracture the bone under the nail.   If the bruise is small and not too painful, it will heal without treatment. If the bruise is large and painful, you may need to have the blood drained.  If a large area of the nail is damaged, your doctor may want to remove it. If he or she does not remove the nail, it may become loose or fall off in the next 2 weeks. In almost all cases, the nail will grow back from the area under the cuticle called the matrix. This takes a few weeks to start and is complete in about 4 to 6 months for a fingernail and 12 months for a toenail. If the nail bed or matrix was damaged, the nail may grow back with a rough or irregular shape. Sometimes the nail may not regrow at all.  Home care  The following guidelines will help you care for your wound at home:    Apply an ice pack (ice cubes in a plastic bag, wrapped in a thin towel) for no more than 20 minutes every 3 to 6 hours for the first 1 to 2 days. Continue this, as needed, 3 to 4 times a day until the pain and swelling goes away.    If the nail was drained:  ? Keep the nail covered with a clean adhesive bandage for the next 2 days. There may be some oozing of  blood during that time, so change the bandage as needed.  ? Rinse the finger or toe once a day under warm running water. Clean any crust away with a cotton-tipped applicator soaked in soapy water.    If the nail was removed:  ? The nail bed (tissue under the nail) is moist, soft and sensitive. This needs to be protected from injury for the first 7 to 10 days until it dries out and becomes hard. Keep it covered with a dressing or adhesive bandage until that time.    Bandages tend to stick to a newly exposed nail bed and can be hard to remove if left in place more than 24 hours. Therefore, unless you were told otherwise, change dressings every 24 hours. Apply petroleum jelly and then a non-stick dressing. This will keep the bandage from sticking and make it easier to remove.  If necessary, soak the dressing off while holding your finger or toe under warm running water.    If an X-ray showed a fracture, protect the finger or toe for 3 to 4 weeks while it is healing.  Here is some information regarding medicine and your wound:    You can take over-the-counter pain medicine such as acetaminophen for pain, unless you were given a different pain medicine to use. Talk with your healthcare provider before using this medicine if you have chronic liver or kidney disease. Also talk with your provider if you have had a stomach ulcer or digestive tract bleeding, or you are taking blood-thinner medicine.    If you were given antibiotics, take them until they are used up. It is important to finish the antibiotics even if the wound looks better. This will ensure the infection has cleared.  Follow-up care  Follow up with your doctor, or as advised. If the nail was drained, there is a small risk of infection. Watch carefully for the signs listed below.  When to seek medical advice  Call your doctor right away if any of these occur:    Increasing redness around the nail    Increasing local pain or swelling    Pus draining from the  nail    Fever of 100.4 F (38 C) or higher, or as directed by your healthcare provider  Date Last Reviewed: 9/1/2016 2000-2017 [employer]. [address] [place] [address]. All rights reserved. This information is not intended as a substitute for professional medical care. Always follow your healthcare professional's instructions.           Patient Education     Subungual Hematoma    You just slammed the car door on your finger. The pain is nearly unbearable, and your nail has turned black and blue. It's likely you have a subungual hematoma. This is a pool of blood that collects under a nail after an injury. Although a nail hematoma is seldom serious, it can be very painful.  When to go to the emergency room (ER)  Any severe blow to a finger or toe should be checked by your health care provider. You may have broken bones or damage to other tissues. If you can't see your health care provider right away, go to the nearest emergency department.  What to expect in the ER    Your nail will be examined.    X-rays may be taken to check for a bone fracture or other injury.    To drain the blood from the hematoma and relieve pain, the health care provider may make a small hole in the nail using a special jacob or drill. The blood under the nail can drain out through this hole. The nail is then bandaged.    If the nail is badly damaged it may need to be removed. Deep cuts beneath the nail can then be repaired with stitches.  Follow-up  If the damaged nail isn't removed, it will most likely fall off on its own. A fingernail can regrow in as little as 8 weeks. Toenails take longer -- about 6 months. See your health care provider if you have any problems with the nail as it heals and regrows.  Date Last Reviewed: 5/5/2015 2000-2017 [employer]. [address] [place] [address]. All rights reserved. This information is not intended as a substitute for professional medical care.  Always follow your healthcare professional's instructions.

## 2021-06-18 NOTE — LETTER
Letter by Jeremy Leon MD at      Author: Jeremy Leon MD Service: -- Author Type: --    Filed:  Encounter Date: 1/24/2019 Status: (Other)         Rasta Montero,    Could not figure out how to just send a my chart note.  So you get a letter.  Dr Leon would like to see you for a diabetes check soon.  Please set up an appointment to be seen    Chana Mora CMA ([facility])

## 2021-06-22 NOTE — PROGRESS NOTES
Subjective:   Kylah Taylor is a(n) 33 y.o. White or  female who presents to [facility] with the following complaint(s):  poss sore throat (x4days ) and poss siuns infection (x4days )    History of Present Illness:  Primary symptom: Sore throat  Onset: 4 day(s) ago  Progression: Persisting  Hoarseness: Minimal  Dysphagia: Yes  Drooling: No  Neck swelling: No  Fevers: No  Chills: Yes, 3-4 days ago  Headache: Yes  Rash: No  Abdominal pain: No  Upper respiratory symptoms: Sinus pressure. Draining green / bloody secretions down her throat, which she expectorates. Bilateral ear pressure.   Additional symptoms: None. Mild hyperglycemia, managed with increased insulin boluses.   Home therapies utilized: Cough drops.   History of recurrent strep: No  History of peritonsillar abscess: No  Exposure to strep: Possibly last weekend.   Exposure to mono: No  Tobacco use / exposure: No    The following portions of the patient's history were reviewed and updated as appropriate: allergies, current medications, past family history, past medical history, past social history, past surgical history and problem list.    Review of Systems:   Review of Systems   All other systems reviewed and are negative.    Objective:     Vitals:    11/30/18 0924   BP: 114/61   Pulse: 97   Resp: 18   Temp: 98.4  F (36.9  C)   TempSrc: Oral   SpO2: 98%   Weight: 142 lb (64.4 kg)     Physical Exam   Constitutional: She is oriented to person, place, and time. She appears well-developed and well-nourished.  Non-toxic appearance. No distress.   HENT:   Head: Normocephalic and atraumatic.   Right Ear: External ear and ear canal normal. A middle ear effusion is present.   Left Ear: External ear and ear canal normal. A middle ear effusion is present.   Nose: No mucosal edema or rhinorrhea. Right sinus exhibits no maxillary sinus tenderness and no frontal sinus tenderness. Left sinus exhibits no maxillary sinus tenderness and no frontal sinus  tenderness.   Mouth/Throat: Uvula is midline and mucous membranes are normal. No oral lesions. Posterior oropharyngeal erythema present. No oropharyngeal exudate. Tonsils are 1+ on the right. Tonsils are 1+ on the left. No tonsillar exudate.   Eyes: Conjunctivae and lids are normal.   Neck: Neck supple. Thyromegaly (right greater than left) present.   Cardiovascular: Normal rate, regular rhythm, S1 normal and S2 normal. Exam reveals no gallop and no friction rub.   No murmur heard.  Pulmonary/Chest: Effort normal and breath sounds normal. No stridor. She has no wheezes. She has no rhonchi. She has no rales.   Lymphadenopathy:     She has no cervical adenopathy.   Neurological: She is alert and oriented to person, place, and time.   Skin: Skin is warm and dry. No rash noted. No pallor.   Nursing note and vitals reviewed.    Laboratory:  Results for orders placed or performed in visit on 11/30/18   Rapid Strep A Screen-Throat   Result Value Ref Range    Rapid Strep A Antigen No Group A Strep detected, presumptive negative No Group A Strep detected, presumptive negative       Radiology:  N/A    Electrocardiogram:  N/A    Assessment/Plan   1. Viral upper respiratory tract infection  - Rapid Strep A Screen-Throat  - Group A Strep, RNA Direct Detection, Throat  - amoxicillin-clavulanate (AUGMENTIN) 875-125 mg per tablet; Take 1 tablet by mouth 2 (two) times a day for 10 days Start in 3-5 days if your symptoms persist / worsen (probable sinus infection).  Dispense: 20 tablet; Refill: 0    2. Eustachian tube dysfunction, bilateral  - fluticasone (FLONASE ALLERGY RELIEF) 50 mcg/actuation nasal spray; 2 sprays into each nostril at bedtime.  Dispense: 16 g; Refill: 0    - Strep screen negative. Reflex testing in process; will start Augmentin if positive. Discussed symptomatic / supportive cares.   - History and examination not consistent with bacterial sinusitis at this time. Prescription provided for Augmentin to be started  if nasal / facial symptoms persist / worsen over the next 3-5 days, as sinusitis would be suspected at that point.   - Prescribed fluticasone nasal spray as listed above for bilateral eustachian tube dysfunction / serous effusions. Administration technique for this medication was reviewed with the patient.   - Counseled patient regarding assessment and plan for evaluation and treatment. Questions were answered. See AVS for the specific written instructions and educational handout(s) regarding viral URI that were provided at the conclusion of the visit.   - Discussed signs / symptoms that warrant urgent / emergent medical attention.   - Follow up as needed.     Moris French MD

## 2021-06-27 ENCOUNTER — HEALTH MAINTENANCE LETTER (OUTPATIENT)
Age: 36
End: 2021-06-27

## 2021-06-27 NOTE — PROGRESS NOTES
Progress Notes by Geovani Freire PA-C at 1/25/2019  2:00 PM     Author: Geovani Freire PA-C Service: -- Author Type: Physician Assistant    Filed: 1/25/2019  3:05 PM Encounter Date: 1/25/2019 Status: Signed    : Geovani Freire PA-C (Physician Assistant)       Subjective:      Patient ID: Kylah Taylor is a 33 y.o. female.    Chief Complaint:    HPI  Kylah Taylor is a 33 y.o. female who presents today complaining of concern for a crush injury to the left long finger.  Patient states that she had dropped the window frame and the finger was pinched between the window frame and sill and crushed her long finger.  She did not have any break in the skin or bleeding but she did have bruising and bleeding underneath the nail.  It is tender and painful and she is coming to clinic today for evaluation and treatment.  The injury happened at 1 PM yesterday is roughly 24 hours all the time of presentation.    She has no other complaints to address to include no other injuries of the fingers and no injury to the hand.  She otherwise has full range of motion to the hand with the exception of tenderness and pain at the end of the fingertip.    She is a type I diabetic with a insulin pump.      Past Medical History:   Diagnosis Date   ? Goiter    ? Type 1 Diabetes Mellitus     Created by Conversion  Replacement Utility updated for latest IMO load       Past Surgical History:   Procedure Laterality Date   ? KNEE SURGERY Right 2004       Family History   Problem Relation Age of Onset   ? No Medical Problems Mother    ? Diabetes type II Father        Social History     Tobacco Use   ? Smoking status: Never Smoker   ? Smokeless tobacco: Never Used   Substance Use Topics   ? Alcohol use: Yes     Frequency: Monthly or less   ? Drug use: No       Review of Systems  As above in HPI, otherwise balance of Review of Systems are negative.    Objective:     /61   Pulse 72   Temp 97  F (36.1  C) (Oral)   Resp 18   Wt  145 lb (65.8 kg)   SpO2 100%   BMI 22.38 kg/m      Physical Exam  General: Patient is resting comfortably no acute distress is afebrile  HEENT: Head is normocephalic atraumatic   Skin: Without rash non-diaphoretic  Musculoskeletal: Examination of left hand shows that there is no pain over the carpals or metacarpals.  She has no pain over the proximal or middle phalanx of the long finger.  The distal phalanx is tender to palpation and there is a subungual hematoma under the nail of the long finger.  It is over 50% of the nail base.    Assessment:     Procedures    Patient was placed with palm down and with a electric cautery the left long finger nail was trephinated and evacuated of the subungual hematoma.  Patient tolerated procedure very well.  Dry dressing consisting of 4 x 4 and 1 inch Coban to hold the dressing in place.  Patient tolerated entire procedure very well was comfortable in the dressing before discharge.    The primary encounter diagnosis was Hematoma, subungual, finger, initial encounter. A diagnosis of Crushing injury of finger, initial encounter was also pertinent to this visit.    Plan:     1. Hematoma, subungual, finger, initial encounter  XR Finger Left 2 or More VWS   2. Crushing injury of finger, initial encounter  XR Finger Left 2 or More VWS       She is to keep the finger clean dry and protected.  Elevate as much as possible.  She is advised not to immerse the hand in water for at least 3 days and then keep it clean and dry thereafter.  Follow-up will be as needed with her primary care provider if any complications such as infection or other concerns arise.  Questions were answered to patient's satisfaction before discharge.    Patient Instructions   Keep the area clean dry and protected.  Elevate as much as possible above the level of the heart  Follow-up with your primary care provider if not getting 100% resolution of any other new symptoms or concerns arise.      Patient Education      Subungual Hematoma  A subungual hematoma is blood under the nail. It can occur when your finger or toe is hit or crushed. It causes the nail to look blue. In some cases, you may fracture the bone under the nail.   If the bruise is small and not too painful, it will heal without treatment. If the bruise is large and painful, you may need to have the blood drained.  If a large area of the nail is damaged, your doctor may want to remove it. If he or she does not remove the nail, it may become loose or fall off in the next 2 weeks. In almost all cases, the nail will grow back from the area under the cuticle called the matrix. This takes a few weeks to start and is complete in about 4 to 6 months for a fingernail and 12 months for a toenail. If the nail bed or matrix was damaged, the nail may grow back with a rough or irregular shape. Sometimes the nail may not regrow at all.  Home care  The following guidelines will help you care for your wound at home:    Apply an ice pack (ice cubes in a plastic bag, wrapped in a thin towel) for no more than 20 minutes every 3 to 6 hours for the first 1 to 2 days. Continue this, as needed, 3 to 4 times a day until the pain and swelling goes away.    If the nail was drained:  ? Keep the nail covered with a clean adhesive bandage for the next 2 days. There may be some oozing of blood during that time, so change the bandage as needed.  ? Rinse the finger or toe once a day under warm running water. Clean any crust away with a cotton-tipped applicator soaked in soapy water.    If the nail was removed:  ? The nail bed (tissue under the nail) is moist, soft and sensitive. This needs to be protected from injury for the first 7 to 10 days until it dries out and becomes hard. Keep it covered with a dressing or adhesive bandage until that time.    Bandages tend to stick to a newly exposed nail bed and can be hard to remove if left in place more than 24 hours. Therefore, unless you were told  otherwise, change dressings every 24 hours. Apply petroleum jelly and then a non-stick dressing. This will keep the bandage from sticking and make it easier to remove.  If necessary, soak the dressing off while holding your finger or toe under warm running water.    If an X-ray showed a fracture, protect the finger or toe for 3 to 4 weeks while it is healing.  Here is some information regarding medicine and your wound:    You can take over-the-counter pain medicine such as acetaminophen for pain, unless you were given a different pain medicine to use. Talk with your healthcare provider before using this medicine if you have chronic liver or kidney disease. Also talk with your provider if you have had a stomach ulcer or digestive tract bleeding, or you are taking blood-thinner medicine.    If you were given antibiotics, take them until they are used up. It is important to finish the antibiotics even if the wound looks better. This will ensure the infection has cleared.  Follow-up care  Follow up with your doctor, or as advised. If the nail was drained, there is a small risk of infection. Watch carefully for the signs listed below.  When to seek medical advice  Call your doctor right away if any of these occur:    Increasing redness around the nail    Increasing local pain or swelling    Pus draining from the nail    Fever of 100.4 F (38 C) or higher, or as directed by your healthcare provider  Date Last Reviewed: 9/1/2016 2000-2017 [employer]. [address] [place], [place] [id number]. All rights reserved. This information is not intended as a substitute for professional medical care. Always follow your healthcare professional's instructions.           Patient Education     Subungual Hematoma    You just slammed the car door on your finger. The pain is nearly unbearable, and your nail has turned black and blue. It's likely you have a subungual hematoma. This is a pool of blood that collects under a  nail after an injury. Although a nail hematoma is seldom serious, it can be very painful.  When to go to the emergency room (ER)  Any severe blow to a finger or toe should be checked by your health care provider. You may have broken bones or damage to other tissues. If you can't see your health care provider right away, go to the nearest emergency department.  What to expect in the ER    Your nail will be examined.    X-rays may be taken to check for a bone fracture or other injury.    To drain the blood from the hematoma and relieve pain, the health care provider may make a small hole in the nail using a special jacob or drill. The blood under the nail can drain out through this hole. The nail is then bandaged.    If the nail is badly damaged it may need to be removed. Deep cuts beneath the nail can then be repaired with stitches.  Follow-up  If the damaged nail isn't removed, it will most likely fall off on its own. A fingernail can regrow in as little as 8 weeks. Toenails take longer -- about 6 months. See your health care provider if you have any problems with the nail as it heals and regrows.  Date Last Reviewed: 5/5/2015 2000-2017 [employer]. [address] [place] [address], [place] [address]. All rights reserved. This information is not intended as a substitute for professional medical care. Always follow your healthcare professional's instructions.

## 2021-07-03 NOTE — ADDENDUM NOTE
Addendum Note by Vanadna Cui RN at 2/7/2017  7:19 PM     Author: Vandana Cui RN Service: -- Author Type: Registered Nurse    Filed: 2/7/2017  7:19 PM Encounter Date: 2/7/2017 Status: Signed    : Vandana Cui RN (Registered Nurse)    Addended by: VANDANA CUI on: 2/7/2017 07:19 PM        Modules accepted: Orders

## 2021-10-17 ENCOUNTER — HEALTH MAINTENANCE LETTER (OUTPATIENT)
Age: 36
End: 2021-10-17

## 2022-01-24 ENCOUNTER — HOSPITAL ENCOUNTER (OUTPATIENT)
Dept: ULTRASOUND IMAGING | Facility: CLINIC | Age: 37
Discharge: HOME | End: 2022-01-24
Attending: INTERNAL MEDICINE | Admitting: INTERNAL MEDICINE
Payer: COMMERCIAL

## 2022-01-24 DIAGNOSIS — E04.1 THYROID NODULE: ICD-10-CM

## 2022-01-24 PROCEDURE — 76536 US EXAM OF HEAD AND NECK: CPT

## 2022-02-06 ENCOUNTER — HEALTH MAINTENANCE LETTER (OUTPATIENT)
Age: 37
End: 2022-02-06

## 2022-03-09 ENCOUNTER — E-VISIT (OUTPATIENT)
Dept: URGENT CARE | Facility: CLINIC | Age: 37
End: 2022-03-09
Payer: COMMERCIAL

## 2022-03-09 DIAGNOSIS — R09.81 NASAL CONGESTION: Primary | ICD-10-CM

## 2022-03-09 PROCEDURE — 99421 OL DIG E/M SVC 5-10 MIN: CPT | Performed by: EMERGENCY MEDICINE

## 2022-03-09 NOTE — PATIENT INSTRUCTIONS
Dear Kylah Taylor    After reviewing your responses, I've been able to diagnose you with?a sinus infection caused by a virus.?     Based on your responses and diagnosis, it is also important to stay well hydrated, get lots of rest and take over-the-counter decongestants,?tylenol?or ibuprofen if you?are able to?take those medications per your primary care provider to help relieve discomfort.? We do not institute antibiotics for 7 to 10 days knowing that the cause of the symptoms at this point time is due to a virus.  Please check back with us in 5 to 7 days if your symptoms do not resolve.    It is important that you take?all of?your prescribed medication even if your symptoms are improving after a few doses.? Taking?all of?your medicine helps prevent the symptoms from returning.?     If your symptoms worsen, you develop severe headache, vomiting, high fever (>102), or are not improving in 7 days, please contact your primary care provider for an appointment or visit any of our convenient [facility] or Urgent Care Centers to be seen which can be found on our website?here.?     Thanks again for choosing?us?as your health care partner,?   ?  Negro Shah MD?

## 2022-07-24 ENCOUNTER — HEALTH MAINTENANCE LETTER (OUTPATIENT)
Age: 37
End: 2022-07-24

## 2022-08-24 ENCOUNTER — TRANSFERRED RECORDS (OUTPATIENT)
Dept: MULTI SPECIALTY CLINIC | Facility: CLINIC | Age: 37
End: 2022-08-24

## 2022-08-24 LAB — RETINOPATHY: NEGATIVE

## 2022-10-02 ENCOUNTER — HEALTH MAINTENANCE LETTER (OUTPATIENT)
Age: 37
End: 2022-10-02

## 2023-01-10 ENCOUNTER — LAB REQUISITION (OUTPATIENT)
Dept: LAB | Facility: CLINIC | Age: 38
End: 2023-01-10
Payer: COMMERCIAL

## 2023-01-10 ENCOUNTER — HOSPITAL ENCOUNTER (OUTPATIENT)
Facility: CLINIC | Age: 38
Discharge: HOME | End: 2023-01-10
Payer: COMMERCIAL

## 2023-01-10 DIAGNOSIS — Z12.4 ENCOUNTER FOR SCREENING FOR MALIGNANT NEOPLASM OF CERVIX: ICD-10-CM

## 2023-01-10 PROCEDURE — 87624 HPV HI-RISK TYP POOLED RSLT: CPT | Mod: ORL | Performed by: NURSE PRACTITIONER

## 2023-01-10 PROCEDURE — G0145 SCR C/V CYTO,THINLAYER,RESCR: HCPCS | Mod: ORL | Performed by: NURSE PRACTITIONER

## 2023-01-10 PROCEDURE — 87624 HPV HI-RISK TYP POOLED RSLT: CPT | Performed by: NURSE PRACTITIONER

## 2023-01-11 PROCEDURE — 87624 HPV HI-RISK TYP POOLED RSLT: CPT | Performed by: NURSE PRACTITIONER

## 2023-01-13 LAB
BKR LAB AP GYN ADEQUACY: NORMAL
BKR LAB AP GYN INTERPRETATION: NORMAL
BKR LAB AP HPV REFLEX: NORMAL
BKR LAB AP LMP: NORMAL
BKR LAB AP PREVIOUS ABNL DX: NORMAL
BKR LAB AP PREVIOUS ABNORMAL: NORMAL
PATH REPORT.COMMENTS IMP SPEC: NORMAL
PATH REPORT.COMMENTS IMP SPEC: NORMAL
PATH REPORT.RELEVANT HX SPEC: NORMAL

## 2023-01-17 LAB
HUMAN PAPILLOMA VIRUS 16 DNA: NEGATIVE
HUMAN PAPILLOMA VIRUS 18 DNA: NEGATIVE
HUMAN PAPILLOMA VIRUS FINAL DIAGNOSIS: NORMAL
HUMAN PAPILLOMA VIRUS OTHER HR: NEGATIVE

## 2023-04-20 ENCOUNTER — OFFICE VISIT (OUTPATIENT)
Dept: FAMILY MEDICINE | Facility: CLINIC | Age: 38
End: 2023-04-20
Payer: OTHER GOVERNMENT

## 2023-04-20 VITALS
HEART RATE: 77 BPM | SYSTOLIC BLOOD PRESSURE: 100 MMHG | OXYGEN SATURATION: 98 % | DIASTOLIC BLOOD PRESSURE: 72 MMHG | BODY MASS INDEX: 22.29 KG/M2 | RESPIRATION RATE: 12 BRPM | WEIGHT: 147.1 LBS | HEIGHT: 68 IN | TEMPERATURE: 98.6 F

## 2023-04-20 DIAGNOSIS — E78.5 HYPERLIPIDEMIA LDL GOAL <70: ICD-10-CM

## 2023-04-20 DIAGNOSIS — K31.84 GASTROPARESIS DIABETICORUM (H): ICD-10-CM

## 2023-04-20 DIAGNOSIS — E10.9 TYPE 1 DIABETES MELLITUS WITHOUT COMPLICATION (H): ICD-10-CM

## 2023-04-20 DIAGNOSIS — E04.9 GOITER: ICD-10-CM

## 2023-04-20 DIAGNOSIS — Z11.4 SCREENING FOR HIV (HUMAN IMMUNODEFICIENCY VIRUS): ICD-10-CM

## 2023-04-20 DIAGNOSIS — E11.43 GASTROPARESIS DIABETICORUM (H): ICD-10-CM

## 2023-04-20 DIAGNOSIS — Z11.59 NEED FOR HEPATITIS C SCREENING TEST: ICD-10-CM

## 2023-04-20 DIAGNOSIS — Z00.00 ROUTINE GENERAL MEDICAL EXAMINATION AT A HEALTH CARE FACILITY: Primary | ICD-10-CM

## 2023-04-20 LAB
ANION GAP SERPL CALCULATED.3IONS-SCNC: 10 MMOL/L (ref 7–15)
BUN SERPL-MCNC: 9.6 MG/DL (ref 6–20)
CALCIUM SERPL-MCNC: 9.5 MG/DL (ref 8.6–10)
CHLORIDE SERPL-SCNC: 97 MMOL/L (ref 98–107)
CHOLEST SERPL-MCNC: 220 MG/DL
CREAT SERPL-MCNC: 0.85 MG/DL (ref 0.51–0.95)
CREAT UR-MCNC: 212 MG/DL
DEPRECATED HCO3 PLAS-SCNC: 26 MMOL/L (ref 22–29)
GFR SERPL CREATININE-BSD FRML MDRD: 89 ML/MIN/1.73M2
GLUCOSE SERPL-MCNC: 182 MG/DL (ref 70–99)
HBA1C MFR BLD: 8.9 % (ref 0–5.6)
HDLC SERPL-MCNC: 88 MG/DL
LDLC SERPL CALC-MCNC: 124 MG/DL
MICROALBUMIN UR-MCNC: 26.3 MG/L
MICROALBUMIN/CREAT UR: 12.41 MG/G CR (ref 0–25)
NONHDLC SERPL-MCNC: 132 MG/DL
POTASSIUM SERPL-SCNC: 3.4 MMOL/L (ref 3.4–5.3)
SODIUM SERPL-SCNC: 133 MMOL/L (ref 136–145)
TRIGL SERPL-MCNC: 41 MG/DL
TSH SERPL DL<=0.005 MIU/L-ACNC: 3.72 UIU/ML (ref 0.3–4.2)

## 2023-04-20 PROCEDURE — 36415 COLL VENOUS BLD VENIPUNCTURE: CPT | Performed by: NURSE PRACTITIONER

## 2023-04-20 PROCEDURE — 80048 BASIC METABOLIC PNL TOTAL CA: CPT | Performed by: NURSE PRACTITIONER

## 2023-04-20 PROCEDURE — 80061 LIPID PANEL: CPT | Performed by: NURSE PRACTITIONER

## 2023-04-20 PROCEDURE — 99214 OFFICE O/P EST MOD 30 MIN: CPT | Mod: 25 | Performed by: NURSE PRACTITIONER

## 2023-04-20 PROCEDURE — 84443 ASSAY THYROID STIM HORMONE: CPT | Performed by: NURSE PRACTITIONER

## 2023-04-20 PROCEDURE — 99385 PREV VISIT NEW AGE 18-39: CPT | Performed by: NURSE PRACTITIONER

## 2023-04-20 PROCEDURE — 82570 ASSAY OF URINE CREATININE: CPT | Performed by: NURSE PRACTITIONER

## 2023-04-20 PROCEDURE — 83036 HEMOGLOBIN GLYCOSYLATED A1C: CPT | Performed by: NURSE PRACTITIONER

## 2023-04-20 PROCEDURE — 82043 UR ALBUMIN QUANTITATIVE: CPT | Performed by: NURSE PRACTITIONER

## 2023-04-20 RX ORDER — INSULIN ASPART 100 [IU]/ML
INJECTION, SOLUTION INTRAVENOUS; SUBCUTANEOUS
Qty: 30 ML | Refills: 4 | Status: SHIPPED | OUTPATIENT
Start: 2023-04-20 | End: 2024-03-04

## 2023-04-20 ASSESSMENT — ENCOUNTER SYMPTOMS
CHILLS: 0
PALPITATIONS: 0
BREAST MASS: 1
DIZZINESS: 0
HEARTBURN: 0
EYE PAIN: 0
PARESTHESIAS: 0
FEVER: 0
MYALGIAS: 0
WEAKNESS: 0
NAUSEA: 0
DYSURIA: 0
HEMATOCHEZIA: 0
ABDOMINAL PAIN: 0
FREQUENCY: 0
HEMATURIA: 0
HEADACHES: 0
SHORTNESS OF BREATH: 0
SORE THROAT: 0
DIARRHEA: 0
CONSTIPATION: 0
NERVOUS/ANXIOUS: 0
JOINT SWELLING: 1
ARTHRALGIAS: 1
COUGH: 0
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
## 2023-04-21 ENCOUNTER — TELEPHONE (OUTPATIENT)
Dept: FAMILY MEDICINE | Facility: CLINIC | Age: 38
End: 2023-04-21
Payer: OTHER GOVERNMENT

## 2023-04-21 RX ORDER — ATORVASTATIN CALCIUM 40 MG/1
40 TABLET, FILM COATED ORAL DAILY
Qty: 90 TABLET | Refills: 3 | Status: SHIPPED | OUTPATIENT
Start: 2023-04-21

## 2023-04-21 NOTE — TELEPHONE ENCOUNTER
RN called patient and relayed lab results.     Patient does not want to start a statin right now. Patient would like to work on her diet and exercise and recheck at her 3 month follow up.    RN relayed information about Basic metabolic panel, patient said that she had ran out of insulin and she known's that her blood sugar was high at the appointment but will let provider know if symptoms occur.    Endo referral has called patient, she has not called back. RN encouraged patient to call clinic if she needs phone number to reach back out to them.    Appointment made for follow up in August.    CRISTI Mehta  [facility]

## 2023-07-07 ENCOUNTER — MYC MEDICAL ADVICE (OUTPATIENT)
Dept: FAMILY MEDICINE | Facility: CLINIC | Age: 38
End: 2023-07-07
Payer: OTHER GOVERNMENT

## 2023-07-29 ENCOUNTER — HOSPITAL ENCOUNTER (EMERGENCY)
Facility: CLINIC | Age: 38
Discharge: HOME | End: 2023-07-30
Attending: EMERGENCY MEDICINE | Admitting: EMERGENCY MEDICINE
Payer: OTHER GOVERNMENT

## 2023-07-29 ENCOUNTER — APPOINTMENT (OUTPATIENT)
Dept: RADIOLOGY | Facility: CLINIC | Age: 38
End: 2023-07-29
Attending: EMERGENCY MEDICINE
Payer: OTHER GOVERNMENT

## 2023-07-29 DIAGNOSIS — M79.671 RIGHT FOOT PAIN: ICD-10-CM

## 2023-07-29 DIAGNOSIS — R53.83 OTHER FATIGUE: ICD-10-CM

## 2023-07-29 PROCEDURE — 96374 THER/PROPH/DIAG INJ IV PUSH: CPT | Mod: 59

## 2023-07-29 PROCEDURE — 73630 X-RAY EXAM OF FOOT: CPT | Mod: RT

## 2023-07-29 PROCEDURE — 99285 EMERGENCY DEPT VISIT HI MDM: CPT | Mod: 25

## 2023-07-29 RX ORDER — KETOROLAC TROMETHAMINE 15 MG/ML
15 INJECTION, SOLUTION INTRAMUSCULAR; INTRAVENOUS ONCE
Status: COMPLETED | OUTPATIENT
Start: 2023-07-30 | End: 2023-07-30

## 2023-07-29 ASSESSMENT — ACTIVITIES OF DAILY LIVING (ADL): ADLS_ACUITY_SCORE: 33

## 2023-07-30 ENCOUNTER — APPOINTMENT (OUTPATIENT)
Dept: ULTRASOUND IMAGING | Facility: CLINIC | Age: 38
End: 2023-07-30
Attending: EMERGENCY MEDICINE
Payer: OTHER GOVERNMENT

## 2023-07-30 VITALS
TEMPERATURE: 98.5 F | DIASTOLIC BLOOD PRESSURE: 67 MMHG | HEART RATE: 86 BPM | RESPIRATION RATE: 18 BRPM | SYSTOLIC BLOOD PRESSURE: 114 MMHG | OXYGEN SATURATION: 97 %

## 2023-07-30 LAB
ANION GAP SERPL CALCULATED.3IONS-SCNC: 13 MMOL/L (ref 5–18)
BASOPHILS # BLD AUTO: 0.1 10E3/UL (ref 0–0.2)
BASOPHILS NFR BLD AUTO: 1 %
BUN SERPL-MCNC: 9 MG/DL (ref 8–22)
C REACTIVE PROTEIN LHE: 0.3 MG/DL (ref 0–?)
CALCIUM SERPL-MCNC: 9.8 MG/DL (ref 8.5–10.5)
CHLORIDE BLD-SCNC: 107 MMOL/L (ref 98–107)
CO2 SERPL-SCNC: 23 MMOL/L (ref 22–31)
CREAT SERPL-MCNC: 0.88 MG/DL (ref 0.6–1.1)
EOSINOPHIL # BLD AUTO: 0.2 10E3/UL (ref 0–0.7)
EOSINOPHIL NFR BLD AUTO: 2 %
ERYTHROCYTE [DISTWIDTH] IN BLOOD BY AUTOMATED COUNT: 11.8 % (ref 10–15)
ERYTHROCYTE [SEDIMENTATION RATE] IN BLOOD BY WESTERGREN METHOD: 12 MM/HR (ref 0–20)
GFR SERPL CREATININE-BSD FRML MDRD: 85 ML/MIN/1.73M2
GLUCOSE BLD-MCNC: 103 MG/DL (ref 70–125)
HCT VFR BLD AUTO: 36.2 % (ref 35–47)
HGB BLD-MCNC: 12.5 G/DL (ref 11.7–15.7)
IMM GRANULOCYTES # BLD: 0 10E3/UL
IMM GRANULOCYTES NFR BLD: 0 %
LYMPHOCYTES # BLD AUTO: 2.2 10E3/UL (ref 0.8–5.3)
LYMPHOCYTES NFR BLD AUTO: 28 %
MCH RBC QN AUTO: 30.3 PG (ref 26.5–33)
MCHC RBC AUTO-ENTMCNC: 34.5 G/DL (ref 31.5–36.5)
MCV RBC AUTO: 88 FL (ref 78–100)
MONOCYTES # BLD AUTO: 0.5 10E3/UL (ref 0–1.3)
MONOCYTES NFR BLD AUTO: 7 %
NEUTROPHILS # BLD AUTO: 4.7 10E3/UL (ref 1.6–8.3)
NEUTROPHILS NFR BLD AUTO: 62 %
NRBC # BLD AUTO: 0 10E3/UL
NRBC BLD AUTO-RTO: 0 /100
PLATELET # BLD AUTO: 314 10E3/UL (ref 150–450)
POTASSIUM BLD-SCNC: 3.7 MMOL/L (ref 3.5–5)
RBC # BLD AUTO: 4.12 10E6/UL (ref 3.8–5.2)
SODIUM SERPL-SCNC: 143 MMOL/L (ref 136–145)
WBC # BLD AUTO: 7.7 10E3/UL (ref 4–11)

## 2023-07-30 PROCEDURE — 86140 C-REACTIVE PROTEIN: CPT | Performed by: EMERGENCY MEDICINE

## 2023-07-30 PROCEDURE — 36415 COLL VENOUS BLD VENIPUNCTURE: CPT | Performed by: EMERGENCY MEDICINE

## 2023-07-30 PROCEDURE — 87015 SPECIMEN INFECT AGNT CONCNTJ: CPT | Performed by: EMERGENCY MEDICINE

## 2023-07-30 PROCEDURE — 85025 COMPLETE CBC W/AUTO DIFF WBC: CPT | Performed by: EMERGENCY MEDICINE

## 2023-07-30 PROCEDURE — 250N000013 HC RX MED GY IP 250 OP 250 PS 637: Performed by: EMERGENCY MEDICINE

## 2023-07-30 PROCEDURE — 250N000011 HC RX IP 250 OP 636: Mod: JZ | Performed by: EMERGENCY MEDICINE

## 2023-07-30 PROCEDURE — 87207 SMEAR SPECIAL STAIN: CPT | Performed by: EMERGENCY MEDICINE

## 2023-07-30 PROCEDURE — 93971 EXTREMITY STUDY: CPT | Mod: RT

## 2023-07-30 PROCEDURE — 82310 ASSAY OF CALCIUM: CPT | Performed by: EMERGENCY MEDICINE

## 2023-07-30 PROCEDURE — 86618 LYME DISEASE ANTIBODY: CPT | Performed by: EMERGENCY MEDICINE

## 2023-07-30 PROCEDURE — 85652 RBC SED RATE AUTOMATED: CPT | Performed by: EMERGENCY MEDICINE

## 2023-07-30 RX ORDER — DOXYCYCLINE 100 MG/1
100 CAPSULE ORAL ONCE
Status: COMPLETED | OUTPATIENT
Start: 2023-07-30 | End: 2023-07-30

## 2023-07-30 RX ORDER — DOXYCYCLINE 100 MG/1
100 CAPSULE ORAL 2 TIMES DAILY
Qty: 14 CAPSULE | Refills: 0 | Status: SHIPPED | OUTPATIENT
Start: 2023-07-30 | End: 2023-08-06

## 2023-07-30 RX ADMIN — DOXYCYCLINE 100 MG: 100 CAPSULE ORAL at 01:48

## 2023-07-30 RX ADMIN — KETOROLAC TROMETHAMINE 15 MG: 15 INJECTION, SOLUTION INTRAMUSCULAR; INTRAVENOUS at 00:05

## 2023-07-30 ASSESSMENT — ACTIVITIES OF DAILY LIVING (ADL): ADLS_ACUITY_SCORE: 35

## 2023-07-30 ASSESSMENT — ENCOUNTER SYMPTOMS
JOINT SWELLING: 0
WOUND: 1
COLOR CHANGE: 1
FATIGUE: 1
ARTHRALGIAS: 1
NAUSEA: 1
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
## 2023-07-30 NOTE — ED TRIAGE NOTES
Pt presents with R foot pain that started earlier. Pt also reports c/o Lyme's disease d/t recent fatigue and GI disturbance. Denies injury or bite to R foot. ABCs intact.      Triage Assessment       Row Name 07/29/23 2130       Triage Assessment (Adult)    Airway WDL WDL       Respiratory WDL    Respiratory WDL WDL       Skin Circulation/Temperature WDL    Skin Circulation/Temperature WDL WDL       Cardiac WDL    Cardiac WDL WDL       Peripheral/Neurovascular WDL    Peripheral Neurovascular WDL WDL       Cognitive/Neuro/Behavioral WDL    Cognitive/Neuro/Behavioral WDL WDL

## 2023-07-30 NOTE — DISCHARGE INSTRUCTIONS
As discussed in the ER it is possible you have cellulitis given the warmth and swelling on the foot.  Would recommend continuing the doxycycline for coverage of skin bacteria.  As well this would cover any possible exposure to Lyme's disease in early July that may have recurred following repeat exposure.  Recommend follow-up with your primary doctor to check Lyme's disease labs as well as right foot symptoms.  If any redness continue the antibiotics and follow-up closely.  Recommend ice, compression, crutch use and elevation to help with swelling and inflammation.  If pain is still present in the right foot would recommend possible repeat x-ray for stress fracture though initial x-rays here in the ER negative.

## 2023-07-31 LAB
ANAPLASMA BLD MOD GIEMSA: NEGATIVE
B BURGDOR IGG+IGM SER QL: 0.09
B MICROTI BLD SMEAR: NEGATIVE
EHRLICHIA SPEC QL MICRO: NEGATIVE

## 2023-09-22 ENCOUNTER — TELEPHONE (OUTPATIENT)
Dept: FAMILY MEDICINE | Facility: CLINIC | Age: 38
End: 2023-09-22

## 2023-09-22 NOTE — TELEPHONE ENCOUNTER
General Call  Contacts         Type Contact Phone/Fax    09/22/2023 10:38 AM CDT Phone (Incoming) Garima from [employer] [phone]     Ext [phone]          Reason for Call:  Patient would like to upgrade to guardian 4 with 780g pump    [employer] is calling because the patient form that was submitted was incomplete.  [employer] is asking that it be re-submitted with all information and faxed to [phone]

## 2023-09-27 NOTE — TELEPHONE ENCOUNTER
General Call    Contacts         Type Contact Phone/Fax    09/22/2023 10:38 AM CDT Phone (Incoming) Garima from [employer] [phone]     Ext [phone]    09/27/2023 12:49 PM CDT Phone (Incoming) Kylah Taylor (Self) [phone] (M)          Reason for Call:  Patient returning a call from staff.  Patient also relays a message from [employer] that no paperwork has been received and is asking it be re-submitted and faxed to [phone].    Please contact [employer] with any questions.  See number below.    Patient requesting a call back.     Could we send this information to you in [employer] or would you prefer to receive a phone call?:   Patient would prefer a phone call   Okay to leave a detailed message?: Yes at Cell number on file:    Telephone Information:   Mobile [phone]

## 2023-09-27 NOTE — TELEPHONE ENCOUNTER
S-(situation): [employer] reporting documentation incomplete, patient requesting follow up    B-(background): See previous.    A-(assessment): Call to [employer], spoke to Chano. Several minutes on call with him before it was realized I was not connected to correct dept. Connected to Blanca to clarify what else they need clinic to submit.    Blanca reports that they do not need any further documentation from us and are waiting for authorization from [facility].    R-(recommendations): No further action at this time.

## 2023-10-20 ENCOUNTER — VIRTUAL VISIT (OUTPATIENT)
Dept: FAMILY MEDICINE | Facility: CLINIC | Age: 38
End: 2023-10-20
Attending: NURSE PRACTITIONER
Payer: OTHER GOVERNMENT

## 2023-10-20 DIAGNOSIS — E10.65 TYPE 1 DIABETES MELLITUS WITH HYPERGLYCEMIA (H): Primary | ICD-10-CM

## 2023-10-20 PROCEDURE — 99213 OFFICE O/P EST LOW 20 MIN: CPT | Mod: VID | Performed by: NURSE PRACTITIONER

## 2023-10-20 NOTE — PROGRESS NOTES
Kylah is a 38 year old who is being evaluated via a billable video visit.      How would you like to obtain your AVS? MyChart  If the video visit is dropped, the invitation should be resent by: Text to cell phone: [phone]  Will anyone else be joining your video visit? No          Assessment & Plan     Type 1 diabetes mellitus with hyperglycemia (H)  Discussed labs due including hemoglobin A1c, cholesterol recheck and urine albumin.  Patient will come in fasting to do these labs.  Discussed with patient to check with insurance to see if there is anything needing to be completed by our office or endocrine to get her CGM and pump squared away.  Discussed the risk of cardiovascular disease and vascular disease if not treating high cholesterol.  Discussed with recommendation for LDL to be below 70 in the event of diabetes which patient does have and is under control.  Highly recommend being on at least a low-dose statin to reduce this.  She is prescribed statin though she is not taking this at all.  She again wants to do lifestyle changes and is aware of the diet and lifestyle changes needing to be made to reduce her LDL cholesterol and blood sugar.  -Declines further referrals for MTM at this time, feels to be a good option moving forward in the event she does not have an endocrinologist soon to help her titrate and adjust her insulin medication.  Also discussed option for starting aspirin given chronic uncontrolled diabetes.  Patient declines at this time    Continue plan for follow-up with me every 3 months for diabetic recheck until establishing care with endocrinology.    - Hemoglobin A1c  - Lipid Profile (Chol, Trig, HDL, LDL calc)  - Albumin Random Urine Quantitative with Creat Ratio                   JAKUB Alarcon [facility]    Subjective   Kylah is a 38 year old, presenting for the following health issues:  Follow Up (Follow up on lab)      HPI   Patient  "with a history of diabetes type 1 and has been under control.  She has been looking for an endocrinologist for some time.  She reports that she is \"picky\" in this area but has several leads.  Unfortunately endocrinologist have long wait times.    As far as monitoring she reports that  CGM is \"back on track\" -there was an upgrade to her guardian for transmitter kit and [employer] 770 G to the 780 G.  She will look into her insurance to see if there is any forms needing to be completed by endocrinology.    Has been \"good' for last week and a half - does have times where she is not eating well. Has more time to pay attention to her diet  Range 100-194 - blood sugar.     DID not start taking the statin .   She did want to see lifestyle changes.   Would be open to check Lipids again.     [employer] 780G -   CGM   Guardian 4 transmitter kit    She does not need any refills on her insulin at this time.            Review of Systems   Constitutional, HEENT, cardiovascular, pulmonary, gi and gu systems are negative, except as otherwise noted.      Objective         Vitals:  No vitals were obtained today due to virtual visit.    Physical Exam   GENERAL: Healthy, alert and no distress  RESP: no SOB while verbalizing history    NEURO: Mentation and speech appropriate for age.  PSYCH: Mentation appears normal, affect normal/bright, judgement and insight intact, normal speech                 Video-Visit Details    Type of service:  Video Visit -switch to phone after connectivity issues    Originating Location (pt. Location): Other Patient home/job  Distant Location (provider location):  On-site  Platform used for Video Visit: Zoom (Telehealth)      "

## 2023-10-21 ENCOUNTER — HEALTH MAINTENANCE LETTER (OUTPATIENT)
Age: 38
End: 2023-10-21

## 2023-11-14 ENCOUNTER — MYC MEDICAL ADVICE (OUTPATIENT)
Dept: FAMILY MEDICINE | Facility: CLINIC | Age: 38
End: 2023-11-14
Payer: OTHER GOVERNMENT

## 2023-11-14 DIAGNOSIS — E10.65 TYPE 1 DIABETES MELLITUS WITH HYPERGLYCEMIA (H): ICD-10-CM

## 2023-11-14 DIAGNOSIS — E04.9 GOITER: Primary | ICD-10-CM

## 2023-11-16 ENCOUNTER — TELEPHONE (OUTPATIENT)
Dept: FAMILY MEDICINE | Facility: CLINIC | Age: 38
End: 2023-11-16
Payer: OTHER GOVERNMENT

## 2023-11-16 NOTE — TELEPHONE ENCOUNTER
General Call      Reason for Call:  follow-up on faxed form     What are your questions or concerns:  calling to follow-up on certificate of medical necessity form that was faxed on 11/13/2023 to fax number ending [phone]    Date of last appointment with provider: 10/20/2023    Please call [employer] back w/update: [phone] ext [phone]

## 2023-11-17 ENCOUNTER — LAB (OUTPATIENT)
Dept: LAB | Facility: CLINIC | Age: 38
End: 2023-11-17
Payer: OTHER GOVERNMENT

## 2023-11-17 DIAGNOSIS — E10.65 TYPE 1 DIABETES MELLITUS WITH HYPERGLYCEMIA (H): ICD-10-CM

## 2023-11-17 DIAGNOSIS — Z11.59 NEED FOR HEPATITIS C SCREENING TEST: ICD-10-CM

## 2023-11-17 DIAGNOSIS — Z11.4 SCREENING FOR HIV (HUMAN IMMUNODEFICIENCY VIRUS): Primary | ICD-10-CM

## 2023-11-17 LAB
CHOLEST SERPL-MCNC: 221 MG/DL
CREAT UR-MCNC: 135 MG/DL
HBA1C MFR BLD: 7.7 % (ref 0–5.6)
HCV AB SERPL QL IA: NONREACTIVE
HDLC SERPL-MCNC: 79 MG/DL
HIV 1+2 AB+HIV1 P24 AG SERPL QL IA: NONREACTIVE
LDLC SERPL CALC-MCNC: 129 MG/DL
MICROALBUMIN UR-MCNC: <12 MG/L
MICROALBUMIN/CREAT UR: NORMAL MG/G{CREAT}
NONHDLC SERPL-MCNC: 142 MG/DL
TRIGL SERPL-MCNC: 64 MG/DL

## 2023-11-17 PROCEDURE — 83036 HEMOGLOBIN GLYCOSYLATED A1C: CPT

## 2023-11-17 PROCEDURE — 86803 HEPATITIS C AB TEST: CPT

## 2023-11-17 PROCEDURE — 36415 COLL VENOUS BLD VENIPUNCTURE: CPT

## 2023-11-17 PROCEDURE — 87389 HIV-1 AG W/HIV-1&-2 AB AG IA: CPT

## 2023-11-17 PROCEDURE — 82570 ASSAY OF URINE CREATININE: CPT

## 2023-11-17 PROCEDURE — 80061 LIPID PANEL: CPT

## 2023-11-17 PROCEDURE — 82043 UR ALBUMIN QUANTITATIVE: CPT

## 2023-11-18 NOTE — RESULT ENCOUNTER NOTE
Your cholesterol testing is mildly elevated though you are still at very low risk for a cardiovascular event. This is good news and means we do not need to consider a statin medication at this time. Cholesterol levels can be maintained with lifestyle modifications to lower cardiovascular disease risk. This includes eating a heart-healthy plant forward diet with more emphasis on vegetables, fruits & whole grains, regular aerobic exercises (30min-1hr daily), maintenance of desirable body weight and avoidance of tobacco products.    Your A1c has improved!  Good work!  Definitely headed in the right direction here. Your goal A1c is less than 7% but definitely getting there with 7.7% down from 8.9%    Normal urine test for renal disease related to diabetes    No Hep C.     Please let me know if you have any questions or concerns.    Thank you for trusting us with your care. It was a pleasure seeing you.  JAKUB Alarcon CNP on 11/17/2023 at 11:20 PM

## 2023-11-20 ENCOUNTER — MEDICAL CORRESPONDENCE (OUTPATIENT)
Dept: HEALTH INFORMATION MANAGEMENT | Facility: CLINIC | Age: 38
End: 2023-11-20

## 2023-11-20 NOTE — TELEPHONE ENCOUNTER
11/20/23  Dolly from [employer] wanting to confirm that fax for certificate of medical necessity was received at the [facility]. Please locate form and confirm with Dolly that it has been received. Per Dolly, this is the last thing they are waiting on.  Please call: [phone] ext [phone]  Ghislaine

## 2023-11-22 NOTE — TELEPHONE ENCOUNTER
11/22/23  Juventino from [employer] calling to check status of the certificate of medical necessity and prior auth. Please see previous message from [employer] rep and address.   Callback: [phone] ext [phone]  Ghislaine

## 2023-12-13 ENCOUNTER — TELEPHONE (OUTPATIENT)
Dept: FAMILY MEDICINE | Facility: CLINIC | Age: 38
End: 2023-12-13

## 2023-12-13 NOTE — TELEPHONE ENCOUNTER
12/13/23  Manav from [employer] requesting this to be refaxed without the sticky note on it that is covering the pt's insurance info. Please refax to:   [phone]    If the form is no longer at the clinic or shredded, please call Manav @ [phone] ex [phone] for a new form to be faxed over.  Ghislaine

## 2023-12-21 ENCOUNTER — TELEPHONE (OUTPATIENT)
Dept: FAMILY MEDICINE | Facility: CLINIC | Age: 38
End: 2023-12-21
Payer: OTHER GOVERNMENT

## 2023-12-21 NOTE — TELEPHONE ENCOUNTER
Please check if there was a fax that came in from [employer] for this patient. Manav faxed form 12/18. Please see if this has been received.

## 2024-01-05 NOTE — TELEPHONE ENCOUNTER
Form is in [facility]'s inbox waiting for to be review.     Leola Campo MA

## 2024-01-05 NOTE — TELEPHONE ENCOUNTER
Concepcion Em [phone]  [phone], calling again to follow up on status of forms faxed.   Please call Manav and update if forms are to be re-faxed to someone else with a fax number.  Thank you.

## 2024-01-23 ENCOUNTER — TELEPHONE (OUTPATIENT)
Dept: FAMILY MEDICINE | Facility: CLINIC | Age: 39
End: 2024-01-23
Payer: OTHER GOVERNMENT

## 2024-01-23 DIAGNOSIS — E10.65 TYPE 1 DIABETES MELLITUS WITH HYPERGLYCEMIA (H): Primary | ICD-10-CM

## 2024-01-23 NOTE — TELEPHONE ENCOUNTER
01/23/24  Order/Referral Request    Who is requesting: Scooter Tidwell Specialist with [facility]    Orders being requested: Endocrinology Referral    Reason service is needed/diagnosis: Per Diann pt wants to see endocrinology at [facility]    When are orders needed by: soon    Has this been discussed with Provider: No    Does patient have a preference on a Group/Provider/Facility? [facility]    Does patient have an appointment scheduled?: No    Where to send orders: Fax [phone] ATTN: [facility] Carrillo: [phone]

## 2024-01-25 NOTE — TELEPHONE ENCOUNTER
Two referrals already in place - per previous note I thought pt was to see Dr Sarkis Lewis already    Will send a third endocrine referral in 1 year

## 2024-01-25 NOTE — TELEPHONE ENCOUNTER
"01/25/2024    Pt called and stated she doesn't want to go to any [facility] endocrinology providers because she doesn't like how [facility] \"handles things\".  Pt states she found a Dr Francisco at [facility] that she wants to go to? Pt states she would like an \"Out of Network\" referral to go to [facility].     TC sending message referral department for \"out of network\" referral.     Izabela Estrada    "

## 2024-03-04 ENCOUNTER — MYC MEDICAL ADVICE (OUTPATIENT)
Dept: FAMILY MEDICINE | Facility: CLINIC | Age: 39
End: 2024-03-04
Payer: OTHER GOVERNMENT

## 2024-03-04 DIAGNOSIS — E10.9 TYPE 1 DIABETES MELLITUS WITHOUT COMPLICATION (H): ICD-10-CM

## 2024-03-06 RX ORDER — INSULIN ASPART 100 [IU]/ML
INJECTION, SOLUTION INTRAVENOUS; SUBCUTANEOUS
Qty: 30 ML | Refills: 4 | Status: SHIPPED | OUTPATIENT
Start: 2024-03-06

## 2024-04-01 ENCOUNTER — MYC MEDICAL ADVICE (OUTPATIENT)
Dept: FAMILY MEDICINE | Facility: CLINIC | Age: 39
End: 2024-04-01
Payer: OTHER GOVERNMENT

## 2024-04-01 DIAGNOSIS — E10.65 TYPE 1 DIABETES MELLITUS WITH HYPERGLYCEMIA (H): Primary | ICD-10-CM

## 2024-04-01 RX ORDER — BLOOD-GLUCOSE TRANSMITTER
1 EACH MISCELLANEOUS ONCE
Qty: 1 EACH | Refills: 1 | Status: SHIPPED | OUTPATIENT
Start: 2024-04-01 | End: 2024-04-01

## 2024-04-01 RX ORDER — BLOOD-GLUCOSE SENSOR
1 EACH MISCELLANEOUS
Qty: 5 EACH | Refills: 11 | Status: SHIPPED | OUTPATIENT
Start: 2024-04-01 | End: 2024-04-08

## 2024-04-08 DIAGNOSIS — E10.65 TYPE 1 DIABETES MELLITUS WITH HYPERGLYCEMIA (H): ICD-10-CM

## 2024-04-08 RX ORDER — BLOOD-GLUCOSE SENSOR
1 EACH MISCELLANEOUS
Qty: 5 EACH | Refills: 11 | Status: SHIPPED | OUTPATIENT
Start: 2024-04-08

## 2024-05-18 ENCOUNTER — HEALTH MAINTENANCE LETTER (OUTPATIENT)
Age: 39
End: 2024-05-18

## 2024-07-27 ENCOUNTER — HEALTH MAINTENANCE LETTER (OUTPATIENT)
Age: 39
End: 2024-07-27

## 2024-12-14 ENCOUNTER — HEALTH MAINTENANCE LETTER (OUTPATIENT)
Age: 39
End: 2024-12-14

## 2025-04-18 ENCOUNTER — LAB REQUISITION (OUTPATIENT)
Dept: LAB | Facility: CLINIC | Age: 40
End: 2025-04-18
Payer: COMMERCIAL

## 2025-04-18 DIAGNOSIS — Z12.4 ENCOUNTER FOR SCREENING FOR MALIGNANT NEOPLASM OF CERVIX: ICD-10-CM

## 2025-04-18 PROCEDURE — 87624 HPV HI-RISK TYP POOLED RSLT: CPT | Mod: ORL | Performed by: OBSTETRICS & GYNECOLOGY

## 2025-04-18 PROCEDURE — G0145 SCR C/V CYTO,THINLAYER,RESCR: HCPCS | Mod: ORL | Performed by: OBSTETRICS & GYNECOLOGY

## 2025-04-18 PROCEDURE — 87624 HPV HI-RISK TYP POOLED RSLT: CPT | Performed by: OBSTETRICS & GYNECOLOGY

## 2025-04-21 LAB
HPV HR 12 DNA CVX QL NAA+PROBE: NEGATIVE
HPV16 DNA CVX QL NAA+PROBE: NEGATIVE
HPV18 DNA CVX QL NAA+PROBE: NEGATIVE
HUMAN PAPILLOMA VIRUS FINAL DIAGNOSIS: NORMAL

## 2025-04-24 LAB
BKR AP ASSOCIATED HPV REPORT: NORMAL
BKR LAB AP GYN ADEQUACY: NORMAL
BKR LAB AP GYN INTERPRETATION: NORMAL
BKR LAB AP LMP: NORMAL
BKR LAB AP PREVIOUS ABNL DX: NORMAL
BKR LAB AP PREVIOUS ABNORMAL: NORMAL
PATH REPORT.COMMENTS IMP SPEC: NORMAL
PATH REPORT.COMMENTS IMP SPEC: NORMAL
PATH REPORT.RELEVANT HX SPEC: NORMAL

## 2025-06-29 ENCOUNTER — HEALTH MAINTENANCE LETTER (OUTPATIENT)
Age: 40
End: 2025-06-29

## 2025-08-10 ENCOUNTER — HEALTH MAINTENANCE LETTER (OUTPATIENT)
Age: 40
End: 2025-08-10